# Patient Record
Sex: MALE | Race: WHITE | NOT HISPANIC OR LATINO | Employment: FULL TIME | URBAN - NONMETROPOLITAN AREA
[De-identification: names, ages, dates, MRNs, and addresses within clinical notes are randomized per-mention and may not be internally consistent; named-entity substitution may affect disease eponyms.]

---

## 2023-01-16 PROBLEM — J45.20 MILD INTERMITTENT ASTHMA (HHS-HCC): Status: ACTIVE | Noted: 2023-01-16

## 2023-01-16 PROBLEM — L25.9 CONTACT DERMATITIS OF HAND: Status: ACTIVE | Noted: 2023-01-16

## 2023-01-16 PROBLEM — R06.83 LOUD SNORING: Status: ACTIVE | Noted: 2023-01-16

## 2023-01-16 PROBLEM — F32.0 DEPRESSION, MAJOR, SINGLE EPISODE, MILD (CMS-HCC): Status: ACTIVE | Noted: 2023-01-16

## 2023-01-16 PROBLEM — K21.9 CHRONIC GERD: Status: ACTIVE | Noted: 2023-01-16

## 2023-01-16 PROBLEM — R69 TAKING MEDICATION FOR CHRONIC DISEASE: Status: ACTIVE | Noted: 2023-01-16

## 2023-01-16 PROBLEM — R09.82 PND (POST-NASAL DRIP): Status: ACTIVE | Noted: 2023-01-16

## 2023-01-16 PROBLEM — E66.01 OBESITY, MORBID (MULTI): Status: ACTIVE | Noted: 2023-01-16

## 2023-01-16 PROBLEM — F41.9 ANXIETY: Status: ACTIVE | Noted: 2023-01-16

## 2023-01-16 RX ORDER — ALBUTEROL SULFATE 90 UG/1
1-2 AEROSOL, METERED RESPIRATORY (INHALATION) EVERY 6 HOURS PRN
COMMUNITY
End: 2024-03-12 | Stop reason: SDUPTHER

## 2023-01-16 RX ORDER — FLUTICASONE PROPIONATE AND SALMETEROL XINAFOATE 45; 21 UG/1; UG/1
2 AEROSOL, METERED RESPIRATORY (INHALATION)
COMMUNITY
End: 2024-03-12 | Stop reason: SDUPTHER

## 2023-01-16 RX ORDER — FLUTICASONE PROPIONATE 50 MCG
1-2 SPRAY, SUSPENSION (ML) NASAL DAILY PRN
COMMUNITY

## 2023-01-16 RX ORDER — ESCITALOPRAM OXALATE 20 MG/1
20 TABLET ORAL DAILY
COMMUNITY
End: 2023-06-30 | Stop reason: SDUPTHER

## 2023-01-16 RX ORDER — MONTELUKAST SODIUM 10 MG/1
10 TABLET ORAL DAILY
COMMUNITY
End: 2023-06-30 | Stop reason: SDUPTHER

## 2023-03-06 ENCOUNTER — OFFICE VISIT (OUTPATIENT)
Dept: PRIMARY CARE | Facility: CLINIC | Age: 43
End: 2023-03-06
Payer: COMMERCIAL

## 2023-03-06 VITALS
HEIGHT: 68 IN | BODY MASS INDEX: 34.1 KG/M2 | SYSTOLIC BLOOD PRESSURE: 124 MMHG | OXYGEN SATURATION: 99 % | DIASTOLIC BLOOD PRESSURE: 60 MMHG | WEIGHT: 225 LBS | HEART RATE: 98 BPM

## 2023-03-06 DIAGNOSIS — F32.0 DEPRESSION, MAJOR, SINGLE EPISODE, MILD (CMS-HCC): ICD-10-CM

## 2023-03-06 DIAGNOSIS — J45.20 MILD INTERMITTENT ASTHMA WITHOUT COMPLICATION (HHS-HCC): ICD-10-CM

## 2023-03-06 DIAGNOSIS — E78.2 MIXED HYPERLIPIDEMIA: ICD-10-CM

## 2023-03-06 DIAGNOSIS — R69 TAKING MEDICATION FOR CHRONIC DISEASE: ICD-10-CM

## 2023-03-06 DIAGNOSIS — Z71.84 TRAVEL ADVICE ENCOUNTER: ICD-10-CM

## 2023-03-06 DIAGNOSIS — K21.9 CHRONIC GERD: ICD-10-CM

## 2023-03-06 PROBLEM — E66.01 OBESITY, MORBID (MULTI): Status: RESOLVED | Noted: 2023-01-16 | Resolved: 2023-03-06

## 2023-03-06 LAB
ANION GAP IN SER/PLAS: 10 MMOL/L (ref 10–20)
CALCIUM (MG/DL) IN SER/PLAS: 9.3 MG/DL (ref 8.6–10.3)
CARBON DIOXIDE, TOTAL (MMOL/L) IN SER/PLAS: 29 MMOL/L (ref 21–32)
CHLORIDE (MMOL/L) IN SER/PLAS: 105 MMOL/L (ref 98–107)
CREATININE (MG/DL) IN SER/PLAS: 0.86 MG/DL (ref 0.5–1.3)
GFR MALE: >90 ML/MIN/1.73M2
GLUCOSE (MG/DL) IN SER/PLAS: 91 MG/DL (ref 74–99)
POTASSIUM (MMOL/L) IN SER/PLAS: 3.9 MMOL/L (ref 3.5–5.3)
SODIUM (MMOL/L) IN SER/PLAS: 140 MMOL/L (ref 136–145)
UREA NITROGEN (MG/DL) IN SER/PLAS: 15 MG/DL (ref 6–23)

## 2023-03-06 PROCEDURE — 3008F BODY MASS INDEX DOCD: CPT | Performed by: INTERNAL MEDICINE

## 2023-03-06 PROCEDURE — 1036F TOBACCO NON-USER: CPT | Performed by: INTERNAL MEDICINE

## 2023-03-06 PROCEDURE — 99213 OFFICE O/P EST LOW 20 MIN: CPT | Performed by: INTERNAL MEDICINE

## 2023-03-06 RX ORDER — ATOVAQUONE AND PROGUANIL HYDROCHLORIDE 250; 100 MG/1; MG/1
TABLET, FILM COATED ORAL
Qty: 15 TABLET | Refills: 0 | Status: SHIPPED | OUTPATIENT
Start: 2023-03-06 | End: 2024-03-12 | Stop reason: WASHOUT

## 2023-03-06 ASSESSMENT — ENCOUNTER SYMPTOMS
CHEST TIGHTNESS: 0
ABDOMINAL PAIN: 0
COUGH: 1
BACK PAIN: 0
WHEEZING: 1
ARTHRALGIAS: 0
BLOOD IN STOOL: 0
PALPITATIONS: 0
SHORTNESS OF BREATH: 0
UNEXPECTED WEIGHT CHANGE: 0

## 2023-03-06 ASSESSMENT — PAIN SCALES - GENERAL: PAINLEVEL: 0-NO PAIN

## 2023-03-06 NOTE — PATIENT INSTRUCTIONS
He had his lab work drawn this morning and we have agreed to contact him with results.  If everything goes according to plan we will see him back in approximately 6 months and just prior to that visit we will want a fasting BMP with history of chronic use of medication and a fasting lipid profile with history of hyperlipidemia

## 2023-03-06 NOTE — PROGRESS NOTES
"Subjective   Patient ID: Matt Donahue is a 42 y.o. male who presents for Annual Exam.    HPI he is here today for his routine checkup.  He was able to stop at the lab this morning so unfortunately we do not have results back as of yet.  We have agreed to call him with results.  We did conduct a review of systems and we also discussed his recent health issues.  He is going to be going to [facility] on Saturday for a missions trip.  He is up-to-date with preventative vaccines.    Review of Systems   Constitutional:  Negative for unexpected weight change.   Respiratory:  Positive for cough and wheezing. Negative for chest tightness and shortness of breath.    Cardiovascular:  Negative for chest pain, palpitations and leg swelling.   Gastrointestinal:  Negative for abdominal pain and blood in stool.        Complains of occasional heartburn symptoms   Musculoskeletal:  Negative for arthralgias and back pain.       Objective   /60   Pulse 98   Ht 1.727 m (5' 8\")   Wt 102 kg (225 lb)   SpO2 99%   BMI 34.21 kg/m²     Physical Exam  Vitals and nursing note reviewed.   Constitutional:       General: He is not in acute distress.     Appearance: Normal appearance.   HENT:      Head: Normocephalic and atraumatic.   Eyes:      Conjunctiva/sclera: Conjunctivae normal.   Cardiovascular:      Rate and Rhythm: Normal rate and regular rhythm.      Heart sounds: Normal heart sounds.   Pulmonary:      Effort: No respiratory distress.      Breath sounds: No wheezing.   Abdominal:      Palpations: Abdomen is soft.      Tenderness: There is no abdominal tenderness. There is no guarding.   Musculoskeletal:         General: No swelling. Normal range of motion.   Skin:     General: Skin is warm and dry.   Neurological:      General: No focal deficit present.      Mental Status: He is alert and oriented to person, place, and time.   Psychiatric:         Behavior: Behavior normal.         Assessment/Plan   Problem List Items Addressed " This Visit          Respiratory    Mild intermittent asthma     He states that this time of year he is having some symptoms with his asthma and he has his rescue inhaler as needed.  He states his symptoms are mild at this time.  Unfortunately his current maintenance inhaler is not covered well on his plan and cost approximately $50 a month.  I have asked that he look into other possible more affordable options with his insurance plan and just simply call.  He will call if his symptoms worsen and he did receive this season's flu vaccine from his workplace back in September            Digestive    Chronic GERD     He is taking over-the-counter Pepcid Complete on an as-needed basis and he reports that his acid reflux is under control most of the time.  We do remind him to eat a healthy diet along with routine exercise and weight loss is often times weight loss can reduce symptoms of acid reflux            Endocrine/Metabolic    BMI 34.0-34.9,adult - Primary       Other    Depression, major, single episode, mild (CMS/[facility])     Reports feeling well on the escitalopram as prescribed

## 2023-03-06 NOTE — ASSESSMENT & PLAN NOTE
He is taking over-the-counter Pepcid Complete on an as-needed basis and he reports that his acid reflux is under control most of the time.  We do remind him to eat a healthy diet along with routine exercise and weight loss is often times weight loss can reduce symptoms of acid reflux

## 2023-03-06 NOTE — ASSESSMENT & PLAN NOTE
He states that this time of year he is having some symptoms with his asthma and he has his rescue inhaler as needed.  He states his symptoms are mild at this time.  Unfortunately his current maintenance inhaler is not covered well on his plan and cost approximately $50 a month.  I have asked that he look into other possible more affordable options with his insurance plan and just simply call.  He will call if his symptoms worsen and he did receive this season's flu vaccine from his workplace back in September

## 2023-03-08 ENCOUNTER — TELEPHONE (OUTPATIENT)
Dept: PRIMARY CARE | Facility: CLINIC | Age: 43
End: 2023-03-08
Payer: COMMERCIAL

## 2023-06-30 DIAGNOSIS — R09.82 PND (POST-NASAL DRIP): Primary | ICD-10-CM

## 2023-06-30 DIAGNOSIS — F32.0 DEPRESSION, MAJOR, SINGLE EPISODE, MILD (CMS-HCC): ICD-10-CM

## 2023-07-02 RX ORDER — ESCITALOPRAM OXALATE 20 MG/1
20 TABLET ORAL DAILY
Qty: 90 TABLET | Refills: 1 | Status: SHIPPED | OUTPATIENT
Start: 2023-07-02 | End: 2023-09-11 | Stop reason: SDUPTHER

## 2023-07-02 RX ORDER — MONTELUKAST SODIUM 10 MG/1
10 TABLET ORAL DAILY
Qty: 90 TABLET | Refills: 1 | Status: SHIPPED | OUTPATIENT
Start: 2023-07-02 | End: 2023-09-11 | Stop reason: SDUPTHER

## 2023-09-11 ENCOUNTER — OFFICE VISIT (OUTPATIENT)
Dept: PRIMARY CARE | Facility: CLINIC | Age: 43
End: 2023-09-11
Payer: COMMERCIAL

## 2023-09-11 ENCOUNTER — LAB (OUTPATIENT)
Dept: LAB | Facility: LAB | Age: 43
End: 2023-09-11
Payer: COMMERCIAL

## 2023-09-11 VITALS
HEART RATE: 70 BPM | HEIGHT: 68 IN | BODY MASS INDEX: 33.65 KG/M2 | DIASTOLIC BLOOD PRESSURE: 78 MMHG | SYSTOLIC BLOOD PRESSURE: 126 MMHG | OXYGEN SATURATION: 98 % | WEIGHT: 222 LBS

## 2023-09-11 DIAGNOSIS — E78.2 MIXED HYPERLIPIDEMIA: ICD-10-CM

## 2023-09-11 DIAGNOSIS — F32.0 DEPRESSION, MAJOR, SINGLE EPISODE, MILD (CMS-HCC): ICD-10-CM

## 2023-09-11 DIAGNOSIS — R69 TAKING MEDICATION FOR CHRONIC DISEASE: ICD-10-CM

## 2023-09-11 DIAGNOSIS — J45.20 MILD INTERMITTENT ASTHMA WITHOUT COMPLICATION (HHS-HCC): Primary | ICD-10-CM

## 2023-09-11 DIAGNOSIS — R09.82 PND (POST-NASAL DRIP): ICD-10-CM

## 2023-09-11 PROBLEM — L25.9 CONTACT DERMATITIS OF HAND: Status: RESOLVED | Noted: 2023-01-16 | Resolved: 2023-09-11

## 2023-09-11 LAB
ANION GAP IN SER/PLAS: 11 MMOL/L (ref 10–20)
CALCIUM (MG/DL) IN SER/PLAS: 9 MG/DL (ref 8.6–10.3)
CARBON DIOXIDE, TOTAL (MMOL/L) IN SER/PLAS: 27 MMOL/L (ref 21–32)
CHLORIDE (MMOL/L) IN SER/PLAS: 106 MMOL/L (ref 98–107)
CHOLESTEROL (MG/DL) IN SER/PLAS: 205 MG/DL (ref 0–199)
CHOLESTEROL IN HDL (MG/DL) IN SER/PLAS: 44 MG/DL
CHOLESTEROL/HDL RATIO: 4.7
CREATININE (MG/DL) IN SER/PLAS: 0.79 MG/DL (ref 0.5–1.3)
GFR MALE: >90 ML/MIN/1.73M2
GLUCOSE (MG/DL) IN SER/PLAS: 92 MG/DL (ref 74–99)
LDL: 136 MG/DL (ref 0–99)
POTASSIUM (MMOL/L) IN SER/PLAS: 4.1 MMOL/L (ref 3.5–5.3)
SODIUM (MMOL/L) IN SER/PLAS: 140 MMOL/L (ref 136–145)
TRIGLYCERIDE (MG/DL) IN SER/PLAS: 127 MG/DL (ref 0–149)
UREA NITROGEN (MG/DL) IN SER/PLAS: 17 MG/DL (ref 6–23)
VLDL: 25 MG/DL (ref 0–40)

## 2023-09-11 PROCEDURE — 3008F BODY MASS INDEX DOCD: CPT | Performed by: INTERNAL MEDICINE

## 2023-09-11 PROCEDURE — 36415 COLL VENOUS BLD VENIPUNCTURE: CPT

## 2023-09-11 PROCEDURE — 80061 LIPID PANEL: CPT

## 2023-09-11 PROCEDURE — 1036F TOBACCO NON-USER: CPT | Performed by: INTERNAL MEDICINE

## 2023-09-11 PROCEDURE — 80048 BASIC METABOLIC PNL TOTAL CA: CPT

## 2023-09-11 PROCEDURE — 99214 OFFICE O/P EST MOD 30 MIN: CPT | Performed by: INTERNAL MEDICINE

## 2023-09-11 RX ORDER — ESCITALOPRAM OXALATE 20 MG/1
20 TABLET ORAL DAILY
Qty: 90 TABLET | Refills: 1 | Status: SHIPPED | OUTPATIENT
Start: 2023-09-11 | End: 2024-03-12 | Stop reason: SDUPTHER

## 2023-09-11 RX ORDER — MONTELUKAST SODIUM 10 MG/1
10 TABLET ORAL DAILY
Qty: 90 TABLET | Refills: 1 | Status: SHIPPED | OUTPATIENT
Start: 2023-09-11 | End: 2024-03-11

## 2023-09-11 ASSESSMENT — ENCOUNTER SYMPTOMS
ABDOMINAL PAIN: 0
WHEEZING: 0
SHORTNESS OF BREATH: 0
BLOOD IN STOOL: 0
ARTHRALGIAS: 0
PALPITATIONS: 0
DIARRHEA: 0
COUGH: 0
FATIGUE: 0
NAUSEA: 0
VOMITING: 0
BACK PAIN: 0

## 2023-09-11 NOTE — PROGRESS NOTES
Subjective   Patient ID: Matt Donahue is a 43 y.o. male who presents for No chief complaint on file..  HPI  He is here today for his routine checkup.  He went to have his lab work drawn this morning so they are not back yet but I have agreed to contact him with the results once they are known.  Overall he states that he is doing okay.  We briefly discussed his history of allergies and asthma and his condition has been stable.  He has been unable to get his maintenance inhaler due to cost issues and lack of good health coverage.  He states he does plan on getting his flu vaccine this season through the workplace.  He is doing well in the way of his anxiety and depression and we are providing a refill on his escitalopram.  He states he occasionally gets heartburn but nothing that is severe or frequent.  If everything goes according to plan we will see him back in 6 months for reevaluation and sooner if any problems.  Review of Systems   Constitutional:  Negative for fatigue.   Respiratory:  Negative for cough, shortness of breath and wheezing.    Cardiovascular:  Negative for chest pain, palpitations and leg swelling.   Gastrointestinal:  Negative for abdominal pain, blood in stool, diarrhea, nausea and vomiting.   Musculoskeletal:  Negative for arthralgias and back pain.     Objective   Physical Exam  Vitals and nursing note reviewed.   Constitutional:       General: He is not in acute distress.     Appearance: Normal appearance.   HENT:      Head: Normocephalic and atraumatic.   Eyes:      Conjunctiva/sclera: Conjunctivae normal.   Cardiovascular:      Rate and Rhythm: Normal rate and regular rhythm.      Heart sounds: Normal heart sounds.   Pulmonary:      Effort: No respiratory distress.      Breath sounds: No wheezing.   Abdominal:      Palpations: Abdomen is soft.      Tenderness: There is no abdominal tenderness. There is no guarding.   Musculoskeletal:         General: No swelling. Normal range of motion.    Skin:     General: Skin is warm and dry.   Neurological:      General: No focal deficit present.      Mental Status: He is alert and oriented to person, place, and time.   Psychiatric:         Behavior: Behavior normal.         Assessment/Plan   Problem List Items Addressed This Visit       Depression, major, single episode, mild (CMS/HCC)     -Doing well         Relevant Medications    escitalopram (Lexapro) 20 mg tablet    Mild intermittent asthma - Primary     -Stable at this time  -He will contact us if we can help him with a new prescription for maintenance inhaler  -He will be getting the season's flu vaccine very soon         PND (post-nasal drip)     -Doing relatively well with montelukast 10 mg daily  -Is still using Flonase nasal spray         Relevant Medications    montelukast (Singulair) 10 mg tablet          Sandy Dias,

## 2023-09-11 NOTE — ASSESSMENT & PLAN NOTE
-Stable at this time  -He will contact us if we can help him with a new prescription for maintenance inhaler  -He will be getting the season's flu vaccine very soon

## 2023-09-11 NOTE — PATIENT INSTRUCTIONS
I will be contacting you via [employer] regarding your laboratory test results and please do not hesitate to reach out to me if you have any questions  Please remember to get the season's flu vaccine  Please let me know if you are in need of a prescription for a maintenance inhaler and possibly we could research what the least expensive inhaler might be on your plan  Otherwise if everything goes according to plan I would like to see you back in approximately 6 months

## 2023-11-01 ENCOUNTER — APPOINTMENT (OUTPATIENT)
Dept: RADIOLOGY | Facility: HOSPITAL | Age: 43
End: 2023-11-01
Payer: COMMERCIAL

## 2023-11-01 ENCOUNTER — HOSPITAL ENCOUNTER (EMERGENCY)
Facility: HOSPITAL | Age: 43
Discharge: HOME | End: 2023-11-01
Attending: EMERGENCY MEDICINE
Payer: COMMERCIAL

## 2023-11-01 VITALS
WEIGHT: 220 LBS | OXYGEN SATURATION: 98 % | HEIGHT: 68 IN | DIASTOLIC BLOOD PRESSURE: 96 MMHG | HEART RATE: 93 BPM | BODY MASS INDEX: 33.34 KG/M2 | TEMPERATURE: 97.9 F | SYSTOLIC BLOOD PRESSURE: 130 MMHG | RESPIRATION RATE: 16 BRPM

## 2023-11-01 DIAGNOSIS — M75.22 BICIPITAL TENDINITIS OF LEFT SHOULDER: Primary | ICD-10-CM

## 2023-11-01 PROCEDURE — 73030 X-RAY EXAM OF SHOULDER: CPT | Mod: LEFT SIDE | Performed by: STUDENT IN AN ORGANIZED HEALTH CARE EDUCATION/TRAINING PROGRAM

## 2023-11-01 PROCEDURE — 2500000001 HC RX 250 WO HCPCS SELF ADMINISTERED DRUGS (ALT 637 FOR MEDICARE OP): Performed by: EMERGENCY MEDICINE

## 2023-11-01 PROCEDURE — 99283 EMERGENCY DEPT VISIT LOW MDM: CPT | Mod: 25 | Performed by: EMERGENCY MEDICINE

## 2023-11-01 PROCEDURE — 73030 X-RAY EXAM OF SHOULDER: CPT | Mod: LT,FY

## 2023-11-01 RX ORDER — IBUPROFEN 600 MG/1
600 TABLET ORAL ONCE
Status: COMPLETED | OUTPATIENT
Start: 2023-11-01 | End: 2023-11-01

## 2023-11-01 RX ADMIN — IBUPROFEN 600 MG: 600 TABLET ORAL at 19:47

## 2023-11-01 ASSESSMENT — COLUMBIA-SUICIDE SEVERITY RATING SCALE - C-SSRS
1. IN THE PAST MONTH, HAVE YOU WISHED YOU WERE DEAD OR WISHED YOU COULD GO TO SLEEP AND NOT WAKE UP?: NO
6. HAVE YOU EVER DONE ANYTHING, STARTED TO DO ANYTHING, OR PREPARED TO DO ANYTHING TO END YOUR LIFE?: NO
2. HAVE YOU ACTUALLY HAD ANY THOUGHTS OF KILLING YOURSELF?: NO

## 2023-11-01 ASSESSMENT — PAIN SCALES - GENERAL
PAINLEVEL_OUTOF10: 3
PAINLEVEL_OUTOF10: 2
PAINLEVEL_OUTOF10: 3

## 2023-11-01 ASSESSMENT — PAIN - FUNCTIONAL ASSESSMENT
PAIN_FUNCTIONAL_ASSESSMENT: 0-10
PAIN_FUNCTIONAL_ASSESSMENT: 0-10

## 2023-11-01 ASSESSMENT — PAIN DESCRIPTION - PAIN TYPE: TYPE: ACUTE PAIN

## 2023-11-01 ASSESSMENT — PAIN DESCRIPTION - ORIENTATION: ORIENTATION: LEFT

## 2023-11-01 ASSESSMENT — PAIN DESCRIPTION - LOCATION: LOCATION: SHOULDER

## 2023-11-01 NOTE — ED PROVIDER NOTES
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  Presents with a chief complaint of left shoulder pain occurred approximately 1.5 hours ago.  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  At that moment he heard a pop in his left shoulder and felt immediate pain.  No numbness or tingling.  Now he has pain at rest which is worse with lifting.         Review of Systems     Physical Exam  Vitals and nursing note reviewed.   Constitutional:       General: He is not in acute distress.     Appearance: He is well-developed.   HENT:      Head: Normocephalic and atraumatic.   Eyes:      Conjunctiva/sclera: Conjunctivae normal.   Cardiovascular:      Rate and Rhythm: Normal rate and regular rhythm.      Heart sounds: No murmur heard.  Pulmonary:      Effort: Pulmonary effort is normal. No respiratory distress.      Breath sounds: Normal breath sounds.   Abdominal:      Palpations: Abdomen is soft.      Tenderness: There is no abdominal tenderness.   Musculoskeletal:         General: No swelling.      Cervical back: Neck supple.      Comments: Focused exam of the left upper extremity reveals no deformity.  He does have tenderness in the area of the bicipital tendon.  He has increased pain with flexion of the left shoulder as well as resisted flexion at the left elbow.  No erythema.  No bulge in the area of the biceps.  Sensation intact distally   Skin:     General: Skin is warm and dry.      Capillary Refill: Capillary refill takes less than 2 seconds.   Neurological:      Mental Status: He is alert.   Psychiatric:         Mood and Affect: Mood normal.          Labs Reviewed - No data to display     XR shoulder left 2+ views   Final Result   No evidence of acute fracture or dislocation.             Signed by: Rk Roper 11/1/2023 7:56 PM   Dictation workstation:   [id number]           Procedures     Medical Decision Making  Patient's  x-rays are negative for fracture or dislocation of the left shoulder.  Clinically exam is consistent with a bicipital tendon injury.  Will recommend sling for the next 2 to 3 days during activity, rest, cold packs, anti-inflammatory medications.         Diagnoses as of 11/01/23 2015   Bicipital tendinitis of left shoulder                    Sylvester Rawls MD  11/01/23 2015

## 2023-11-02 NOTE — DISCHARGE INSTRUCTIONS
Wear sling for the next 2 or 3 days during activity.  May take it off if sitting resting or sleeping.  It is for comfort only.  Recommend rest, cold packs, anti-inflammatory medications.

## 2023-11-06 ENCOUNTER — APPOINTMENT (OUTPATIENT)
Dept: PRIMARY CARE | Facility: CLINIC | Age: 43
End: 2023-11-06
Payer: COMMERCIAL

## 2023-12-18 ENCOUNTER — EVALUATION (OUTPATIENT)
Dept: PHYSICAL THERAPY | Facility: CLINIC | Age: 43
End: 2023-12-18
Payer: COMMERCIAL

## 2023-12-18 DIAGNOSIS — M75.22 BICIPITAL TENDINITIS, LEFT SHOULDER: ICD-10-CM

## 2023-12-18 PROCEDURE — 97161 PT EVAL LOW COMPLEX 20 MIN: CPT | Mod: GP

## 2023-12-18 ASSESSMENT — ENCOUNTER SYMPTOMS
DEPRESSION: 0
OCCASIONAL FEELINGS OF UNSTEADINESS: 0
LOSS OF SENSATION IN FEET: 0

## 2023-12-18 ASSESSMENT — PAIN SCALES - GENERAL: PAINLEVEL_OUTOF10: 4

## 2023-12-18 ASSESSMENT — PAIN - FUNCTIONAL ASSESSMENT: PAIN_FUNCTIONAL_ASSESSMENT: 0-10

## 2023-12-18 ASSESSMENT — ACTIVITIES OF DAILY LIVING (ADL): EFFECT OF PAIN ON DAILY ACTIVITIES: SEE GOALS

## 2023-12-18 NOTE — Clinical Note
December 18, 2023    ALESSANDRO Poole  [address]  [address]  [facility] [address]    Patient: Matt Donahue   YOB: 1980   Date of Visit: 12/18/2023       Dear Alessandro Poole  [address]  [address]  [place] [address]    The attached plan of care is being sent to you because your patient’s medical reimbursement requires that you certify the plan of care. Your signature is required to allow uninterrupted insurance coverage.      You may indicate your approval by signing below and faxing this form back to us at Dept Fax: [phone].    Please call Dept: [phone] with any questions or concerns.    Thank you for this referral,        Billy Lozano, PT  [address] [address]    Payer: Payor: IND[employer] EMPLOYEE / Plan: INDUSTRIAL EMPLOYEE / Product Type: *No Product type* /                                                                         Date:     Dear Billy Lozano, PT,     Re: Mr. Matt Donahue, MRN:[id number]    I certify that I have reviewed the attached plan of care and it is medically necessary for Mr. Matt Donahue (1980) who is under my care.          ______________________________________                    _________________  Provider name and credentials                                           Date and time                                                                                           Plan of Care 12/18/23   Effective from: 12/18/2023  Effective to: 3/17/2024    Plan ID: [id number]            Participants as of Finalize on 12/18/2023    Name Type Comments Contact Info    ALESSANDRO Poole Referring Provider  [phone]    Billy Lozano PT Physical Therapist  [phone]       Last Plan Note     Author: Billy Lozano PT Status: Signed Last edited: 12/18/2023 11:30 AM       Physical Therapy Evaluation and Treatment      Patient Name: Matt Donahue  MRN:  [id number]  Today's Date: 12/18/2023  Time Calculation  Start Time: 1145  Stop Time: 1215  Time Calculation (min): 30 min      Assessment:  Rehab Prognosis: Excellent  C/C sx began with a patient transfer on 11/1.   Films were taken 11/1.  Physical findings include mild shldr pain with endrange active elevation.   There is also pain at the anterior shldr with abd MMT and PROM endranges.   Continue with open to closed chain ROM/PRE as trent.  Plan:  Treatment/Interventions: Cryotherapy, Dry needling, Education/ Instruction, Electrical stimulation, Hot pack, Manual therapy, Self care/ home management, Therapeutic exercises (IASTM/cupping)  PT Plan: Skilled PT  PT Frequency: 3 times per week  Duration: 4wks  Onset Date: 11/01/23  Rehab Potential: Excellent  Plan of Care Agreement: Patient    Current Problem:   1. Bicipital tendinitis, left shoulder  Referral to Physical Therapy          Subjective    General:  General  Reason for Referral: L shldr biceps tendinitis  Referred By: Rodrigo  Precautions:  Precautions  STEADI Fall Risk Score (The score of 4 or more indicates an increased risk of falling): 0  Precautions Comment: none    Pain:  Pain Assessment  Pain Assessment: 0-10  Pain Score: 4 (max sx)  Pain Location: Shoulder  Pain Orientation: Left  Effect of Pain on Daily Activities: see goals    Prior Level of Function:  Prior Function Per Pt/Caregiver Report  Vocational: Full time employment (nurse)  Hand Dominance: Right    Objective     Outcome Measures:  Other Measures  Other Outcome Measures: 9% QDI     Treatments: eval only-time   Continue with open to closed chain ROM/PRE as trent.  OP EDUCATION:  Outpatient Education  Individual(s) Educated: Patient  Patient/Caregiver Demonstrated Understanding: yes  Plan of Care Discussed and Agreed Upon: yes    Goals:  Active       PT Problem       PT Goal 1       Start:  12/18/23    Expected End:  03/17/24       1. Independent HEP to allow for 50% reduction in max ADL C/C sx (  "4/10) 2-3wks  2. 0/10 night time sx to allow for uninterrupted sleep x 1wk ( 7/7) 2-3wks  3. Survey score improvement from 9% to 0% (QDI) 3-4WKS  4. ROM increase to allow for ADL driving (full active elevation with reduced pain) 3-4wks  5. Strength increase to allow for improved ADL patient transfers (from 4/4+ abd/ER to 5/5 L shldr) 3-4wks         PT Goal 2       Start:  12/18/23    Expected End:  03/17/24       1. .\"I hope to have no pain after a work shift\"             Shoulder        Shoulder AROM WFL unless documented below  R Shoulder flexion: (180°): 150  L Shoulder flexion: (180°): 150 (mild shldr pain)  Shoulder PROM WFL unless documented below  R shoulder flexion: (180°): 155  L shoulder flexion: (180°): 155  R shoulder ER: (90°): 90  L shoulder ER: (90°): 85 (pop at the shldr with mild pain)  R shoulder IR: (70°): 80  L shoulder IR: (70°): 90    Shoulder Strength WFL unless documented below  R shoulder abduction: (5/5): 5/5  L shoulder abduction: (5/5): 4/5 (anterior shldr pain)  R shoulder ER: (5/5): 5/5  L shoulder ER: (5/5): 4+/5  R shoulder IR: (5/5) : 5/5  L shoulder IR: (5/5): 4+/5           Current Participants as of 12/18/2023    Name Type Comments Contact Info    Kathy Wallace, ARY-CNP Referring Provider  [phone]    Signature pending    Billy Lozano, PT Physical Therapist  [phone]          "

## 2023-12-18 NOTE — PROGRESS NOTES
Physical Therapy Evaluation and Treatment      Patient Name: Matt Donahue  MRN: [id number]  Today's Date: 12/18/2023  Time Calculation  Start Time: 1145  Stop Time: 1215  Time Calculation (min): 30 min      Assessment:  Rehab Prognosis: Excellent  C/C sx began with a patient transfer on 11/1.   Films were taken 11/1.  Physical findings include mild shldr pain with endrange active elevation.   There is also pain at the anterior shldr with abd MMT and PROM endranges.   Continue with open to closed chain ROM/PRE as trent.  Plan:  Treatment/Interventions: Cryotherapy, Dry needling, Education/ Instruction, Electrical stimulation, Hot pack, Manual therapy, Self care/ home management, Therapeutic exercises (IASTM/cupping)  PT Plan: Skilled PT  PT Frequency: 3 times per week  Duration: 4wks  Onset Date: 11/01/23  Rehab Potential: Excellent  Plan of Care Agreement: Patient    Current Problem:   1. Bicipital tendinitis, left shoulder  Referral to Physical Therapy          Subjective    General:  General  Reason for Referral: L shldr biceps tendinitis  Referred By: Rodrigo  Precautions:  Precautions  STEADI Fall Risk Score (The score of 4 or more indicates an increased risk of falling): 0  Precautions Comment: none    Pain:  Pain Assessment  Pain Assessment: 0-10  Pain Score: 4 (max sx)  Pain Location: Shoulder  Pain Orientation: Left  Effect of Pain on Daily Activities: see goals    Prior Level of Function:  Prior Function Per Pt/Caregiver Report  Vocational: Full time employment (nurse)  Hand Dominance: Right    Objective     Outcome Measures:  Other Measures  Other Outcome Measures: 9% QDI     Treatments: eval only-time   Continue with open to closed chain ROM/PRE as trent.  OP EDUCATION:  Outpatient Education  Individual(s) Educated: Patient  Patient/Caregiver Demonstrated Understanding: yes  Plan of Care Discussed and Agreed Upon: yes    Goals:  Active       PT Problem       PT Goal 1       Start:  12/18/23    Expected End:   "03/17/24       1. Independent HEP to allow for 50% reduction in max ADL C/C sx ( 4/10) 2-3wks  2. 0/10 night time sx to allow for uninterrupted sleep x 1wk ( 7/7) 2-3wks  3. Survey score improvement from 9% to 0% (QDI) 3-4WKS  4. ROM increase to allow for ADL driving (full active elevation with reduced pain) 3-4wks  5. Strength increase to allow for improved ADL patient transfers (from 4/4+ abd/ER to 5/5 L shldr) 3-4wks         PT Goal 2       Start:  12/18/23    Expected End:  03/17/24       1. .\"I hope to have no pain after a work shift\"             Shoulder        Shoulder AROM WFL unless documented below  R Shoulder flexion: (180°): 150  L Shoulder flexion: (180°): 150 (mild shldr pain)  Shoulder PROM WFL unless documented below  R shoulder flexion: (180°): 155  L shoulder flexion: (180°): 155  R shoulder ER: (90°): 90  L shoulder ER: (90°): 85 (pop at the shldr with mild pain)  R shoulder IR: (70°): 80  L shoulder IR: (70°): 90    Shoulder Strength WFL unless documented below  R shoulder abduction: (5/5): 5/5  L shoulder abduction: (5/5): 4/5 (anterior shldr pain)  R shoulder ER: (5/5): 5/5  L shoulder ER: (5/5): 4+/5  R shoulder IR: (5/5) : 5/5  L shoulder IR: (5/5): 4+/5  5/5 B elb flex/ext without pain  "

## 2023-12-19 ENCOUNTER — TREATMENT (OUTPATIENT)
Dept: PHYSICAL THERAPY | Facility: CLINIC | Age: 43
End: 2023-12-19
Payer: COMMERCIAL

## 2023-12-19 DIAGNOSIS — M75.22 BICIPITAL TENDINITIS, LEFT SHOULDER: ICD-10-CM

## 2023-12-19 PROCEDURE — 97110 THERAPEUTIC EXERCISES: CPT | Mod: GP,CQ

## 2023-12-19 PROCEDURE — 97140 MANUAL THERAPY 1/> REGIONS: CPT | Mod: GP,CQ

## 2023-12-19 ASSESSMENT — PAIN - FUNCTIONAL ASSESSMENT: PAIN_FUNCTIONAL_ASSESSMENT: 0-10

## 2023-12-19 ASSESSMENT — PAIN SCALES - GENERAL: PAINLEVEL_OUTOF10: 4

## 2023-12-19 NOTE — PROGRESS NOTES
"Physical Therapy  Treatment      Patient Name: Matt Donahue  MRN: [id number]  Today's Date: 12/19/2023  Time Calculation  Start Time: 1354  Stop Time: 1443  Time Calculation (min): 49 min      Assessment: Patient identified by name and birth date. IASTM/STM completed to reduce pain and soft tissue restriction in L shoulder musculature. Instructed in shoulder T band exercises for HEP. Bands , sheet, and Thera -Loop given. He voiced good understanding.        Plan:Continue with manual therapy to reduce soft tissue restriction / pain and strengthening to improved stability of L shoulder for  increased ease/endurance with work ADLs.-CG.         Treatment/Interventions: Cryotherapy, Dry needling, Education/ Instruction, Electrical stimulation, Hot pack, Manual therapy, Self care/ home management, Therapeutic exercises (IASTM/cupping)  PT Plan: Skilled PT  PT Frequency: 3 times per week  Duration: 4wks  Onset Date: 11/01/23  Rehab Potential: Excellent  Plan of Care Agreement: Patient  Current Problem:   1. Bicipital tendinitis, left shoulder  Follow Up In Physical Therapy            Subjective  He reports after his work shift pain  4/10 in L shoulder at anterior /superior aspect. Notes with initial work injury his shoulder was dislocated and he \"popped it in\" .        Precautions:  Precautions  Precautions Comment: none    Pain:  Pain Assessment  Pain Assessment: 0-10  Pain Score: 4  Pain Type: Acute pain  Pain Location: Shoulder  Pain Orientation: Left          Treatments:   Standing UBE 3mins fwd 3mins bwd   ER Grn Band walk Outs 10x (N)  IR Blue Band Walk Outs 10x (N)  Rows Pink Cords 10x (N)  SA Punches 10x (N)            Manual:        IASTM/STM to L: pecs delts UT biceps                    HG9 brush J hook frame Bevel Up 0-30* (N)  PROM all planes   GH mobs posterior grade 1-2      OP EDUCATION:     Access Code: [email or web address]  URL: [email or web address]  Date: 12/19/2023  Prepared by: Luda " "Villagomez    Exercises  - Shoulder External Rotation with Anchored Resistance  - 1 x daily - 7 x weekly - 2 sets - 10 reps  - Shoulder Internal Rotation with Resistance  - 1 x daily - 7 x weekly - 2 sets - 10 reps  - Standing Shoulder Row with Anchored Resistance  - 1 x daily - 7 x weekly - 2 sets - 10 reps  Goals:  Active       PT Problem       PT Goal 1       Start:  12/18/23    Expected End:  03/17/24       1. Independent HEP to allow for 50% reduction in max ADL C/C sx ( 4/10) 2-3wks  2. 0/10 night time sx to allow for uninterrupted sleep x 1wk ( 7/7) 2-3wks  3. Survey score improvement from 9% to 0% (QDI) 3-4WKS  4. ROM increase to allow for ADL driving (full active elevation with reduced pain) 3-4wks  5. Strength increase to allow for improved ADL patient transfers (from 4/4+ abd/ER to 5/5 L shldr) 3-4wks         PT Goal 2       Start:  12/18/23    Expected End:  03/17/24       1. .\"I hope to have no pain after a work shift\"             "

## 2023-12-20 ENCOUNTER — APPOINTMENT (OUTPATIENT)
Dept: PHYSICAL THERAPY | Facility: CLINIC | Age: 43
End: 2023-12-20
Payer: COMMERCIAL

## 2023-12-27 ENCOUNTER — HOSPITAL ENCOUNTER (EMERGENCY)
Facility: HOSPITAL | Age: 43
Discharge: HOME | End: 2023-12-27
Attending: EMERGENCY MEDICINE
Payer: COMMERCIAL

## 2023-12-27 ENCOUNTER — APPOINTMENT (OUTPATIENT)
Dept: RADIOLOGY | Facility: HOSPITAL | Age: 43
End: 2023-12-27
Payer: COMMERCIAL

## 2023-12-27 ENCOUNTER — APPOINTMENT (OUTPATIENT)
Dept: CARDIOLOGY | Facility: HOSPITAL | Age: 43
End: 2023-12-27
Payer: COMMERCIAL

## 2023-12-27 VITALS
WEIGHT: 220 LBS | OXYGEN SATURATION: 99 % | DIASTOLIC BLOOD PRESSURE: 71 MMHG | RESPIRATION RATE: 18 BRPM | BODY MASS INDEX: 33.34 KG/M2 | TEMPERATURE: 98.2 F | SYSTOLIC BLOOD PRESSURE: 128 MMHG | HEIGHT: 68 IN | HEART RATE: 88 BPM

## 2023-12-27 DIAGNOSIS — R07.9 CHEST PAIN, UNSPECIFIED TYPE: Primary | ICD-10-CM

## 2023-12-27 LAB
ALBUMIN SERPL BCP-MCNC: 4.5 G/DL (ref 3.4–5)
ALP SERPL-CCNC: 102 U/L (ref 33–120)
ALT SERPL W P-5'-P-CCNC: 29 U/L (ref 10–52)
ANION GAP SERPL CALC-SCNC: 14 MMOL/L (ref 10–20)
AST SERPL W P-5'-P-CCNC: 22 U/L (ref 9–39)
BASOPHILS # BLD AUTO: 0.05 X10*3/UL (ref 0–0.1)
BASOPHILS NFR BLD AUTO: 0.7 %
BILIRUB SERPL-MCNC: 0.7 MG/DL (ref 0–1.2)
BUN SERPL-MCNC: 17 MG/DL (ref 6–23)
CALCIUM SERPL-MCNC: 9.5 MG/DL (ref 8.6–10.3)
CARDIAC TROPONIN I PNL SERPL HS: <3 NG/L (ref 0–20)
CARDIAC TROPONIN I PNL SERPL HS: <3 NG/L (ref 0–20)
CHLORIDE SERPL-SCNC: 106 MMOL/L (ref 98–107)
CO2 SERPL-SCNC: 22 MMOL/L (ref 21–32)
CREAT SERPL-MCNC: 0.85 MG/DL (ref 0.5–1.3)
EOSINOPHIL # BLD AUTO: 0.13 X10*3/UL (ref 0–0.7)
EOSINOPHIL NFR BLD AUTO: 1.8 %
ERYTHROCYTE [DISTWIDTH] IN BLOOD BY AUTOMATED COUNT: 11.6 % (ref 11.5–14.5)
GFR SERPL CREATININE-BSD FRML MDRD: >90 ML/MIN/1.73M*2
GLUCOSE SERPL-MCNC: 105 MG/DL (ref 74–99)
HCT VFR BLD AUTO: 42.1 % (ref 41–52)
HGB BLD-MCNC: 15 G/DL (ref 13.5–17.5)
IMM GRANULOCYTES # BLD AUTO: 0.03 X10*3/UL (ref 0–0.7)
IMM GRANULOCYTES NFR BLD AUTO: 0.4 % (ref 0–0.9)
LYMPHOCYTES # BLD AUTO: 2 X10*3/UL (ref 1.2–4.8)
LYMPHOCYTES NFR BLD AUTO: 27.4 %
MAGNESIUM SERPL-MCNC: 2.06 MG/DL (ref 1.6–2.4)
MCH RBC QN AUTO: 31 PG (ref 26–34)
MCHC RBC AUTO-ENTMCNC: 35.6 G/DL (ref 32–36)
MCV RBC AUTO: 87 FL (ref 80–100)
MONOCYTES # BLD AUTO: 0.87 X10*3/UL (ref 0.1–1)
MONOCYTES NFR BLD AUTO: 11.9 %
NEUTROPHILS # BLD AUTO: 4.21 X10*3/UL (ref 1.2–7.7)
NEUTROPHILS NFR BLD AUTO: 57.8 %
NRBC BLD-RTO: 0 /100 WBCS (ref 0–0)
PLATELET # BLD AUTO: 174 X10*3/UL (ref 150–450)
POTASSIUM SERPL-SCNC: 3.8 MMOL/L (ref 3.5–5.3)
PROT SERPL-MCNC: 7 G/DL (ref 6.4–8.2)
RBC # BLD AUTO: 4.84 X10*6/UL (ref 4.5–5.9)
SODIUM SERPL-SCNC: 138 MMOL/L (ref 136–145)
WBC # BLD AUTO: 7.3 X10*3/UL (ref 4.4–11.3)

## 2023-12-27 PROCEDURE — 36415 COLL VENOUS BLD VENIPUNCTURE: CPT | Performed by: EMERGENCY MEDICINE

## 2023-12-27 PROCEDURE — 84484 ASSAY OF TROPONIN QUANT: CPT | Performed by: EMERGENCY MEDICINE

## 2023-12-27 PROCEDURE — 93005 ELECTROCARDIOGRAM TRACING: CPT

## 2023-12-27 PROCEDURE — 80053 COMPREHEN METABOLIC PANEL: CPT | Performed by: EMERGENCY MEDICINE

## 2023-12-27 PROCEDURE — 71045 X-RAY EXAM CHEST 1 VIEW: CPT

## 2023-12-27 PROCEDURE — 85025 COMPLETE CBC W/AUTO DIFF WBC: CPT | Performed by: EMERGENCY MEDICINE

## 2023-12-27 PROCEDURE — 99284 EMERGENCY DEPT VISIT MOD MDM: CPT | Performed by: EMERGENCY MEDICINE

## 2023-12-27 PROCEDURE — 83735 ASSAY OF MAGNESIUM: CPT | Performed by: EMERGENCY MEDICINE

## 2023-12-27 PROCEDURE — 71045 X-RAY EXAM CHEST 1 VIEW: CPT | Performed by: RADIOLOGY

## 2023-12-27 RX ORDER — ACETAMINOPHEN 500 MG
5000 TABLET ORAL DAILY
COMMUNITY

## 2023-12-27 ASSESSMENT — PAIN DESCRIPTION - DESCRIPTORS
DESCRIPTORS: HEAVINESS
DESCRIPTORS: HEAVINESS;PRESSURE

## 2023-12-27 ASSESSMENT — PAIN SCALES - GENERAL: PAINLEVEL_OUTOF10: 3

## 2023-12-27 ASSESSMENT — HEART SCORE
AGE: 45-64
HEART SCORE: 3
RISK FACTORS: 1-2 RISK FACTORS
ECG: NORMAL
HISTORY: MODERATELY SUSPICIOUS
TROPONIN: LESS THAN OR EQUAL TO NORMAL LIMIT

## 2023-12-27 ASSESSMENT — COLUMBIA-SUICIDE SEVERITY RATING SCALE - C-SSRS
1. IN THE PAST MONTH, HAVE YOU WISHED YOU WERE DEAD OR WISHED YOU COULD GO TO SLEEP AND NOT WAKE UP?: NO
2. HAVE YOU ACTUALLY HAD ANY THOUGHTS OF KILLING YOURSELF?: NO
6. HAVE YOU EVER DONE ANYTHING, STARTED TO DO ANYTHING, OR PREPARED TO DO ANYTHING TO END YOUR LIFE?: NO

## 2023-12-27 ASSESSMENT — PAIN - FUNCTIONAL ASSESSMENT: PAIN_FUNCTIONAL_ASSESSMENT: 0-10

## 2023-12-27 ASSESSMENT — PAIN DESCRIPTION - LOCATION: LOCATION: CHEST

## 2023-12-27 NOTE — ED PROVIDER NOTES
HPI   No chief complaint on file.      Limitations to History: None    HPI: 43-year-old male presents with concern for chest tightness.  Began 10 minutes prior to arrival.  States he also has a tightness in his throat and into his face as well as left shoulder.  Denies any shortness of breath, nausea, vomiting, diaphoresis.  Denies any fever, chills, cough, fall or trauma.    ------------------------------------------------------------------------------------------------------------------------------------------  Physical Exam:    VS: As documented in the triage note and EMR flowsheet from this visit were reviewed.    Appearance: Alert. cooperative,  in no acute distress.   Skin: Intact,  dry skin, no lesions, rash, petechiae or purpura.   Eyes: PERRLA, EOMs intact,  Conjunctiva pink with no redness or exudates.   HENT: Normocephalic, atraumatic. Nares patent. No intraoral lesions.   Neck: Supple, without meningismus. Trachea at midline. No lymphadenopathy.  Pulmonary: Clear bilaterally with good chest wall excursion. No rales, rhonchi or wheezing. No accessory muscle use or stridor.  Cardiac: Regular rate and rhythm, no rubs, murmurs, or gallops.   Abdomen: Abdomen is soft, nontender, and nondistended.  No palpable organomegaly.  No rebound or guarding.  No CVA tenderness. Nonsurgical abdomen  Genitourinary: Exam deferred.  Musculoskeletal: Full range of motion.  Pulses full and equal. No cyanosis, clubbing, or edema.  Neurological:  Cranial nerves are grossly intact, grossly normal sensation, no weakness, no focal findings identified.  Psychiatric: Appropriate mood and affect.                            No data recorded                Patient History   Past Medical History:   Diagnosis Date    Anxiety     Arthritis     Asthma     Depression      Past Surgical History:   Procedure Laterality Date    APPENDECTOMY  1996    OTHER SURGICAL HISTORY  10/31/2019    Appendectomy     Family History   Problem Relation Name  Age of Onset    Arthritis Mother Sharon     Hypertension Mother Sharon     Asthma Mother Sharon     Hyperlipidemia Mother Sharon     Arthritis Father Chirag     Hypertension Father Chirag     Hyperlipidemia Father Chirag     Heart attack Father Chirag      Social History     Tobacco Use    Smoking status: Never     Passive exposure: Never    Smokeless tobacco: Never   Substance Use Topics    Alcohol use: Yes     Alcohol/week: 2.0 standard drinks of alcohol     Types: 2 Cans of beer per week    Drug use: Not Currently       Physical Exam   ED Triage Vitals   Temp Pulse Resp BP   -- -- -- --      SpO2 Temp src Heart Rate Source Patient Position   -- -- -- --      BP Location FiO2 (%)     -- --       Physical Exam    ED Course & MDM   Diagnoses as of 12/27/23 1057   Chest pain, unspecified type       Medical Decision Making  Labs Reviewed  COMPREHENSIVE METABOLIC PANEL - Abnormal     Glucose                       105 (*)                Sodium                        138                    Potassium                     3.8                    Chloride                      106                    Bicarbonate                   22                     Anion Gap                     14                     Urea Nitrogen                 17                     Creatinine                    0.85                   eGFR                          >90                    Calcium                       9.5                    Albumin                       4.5                    Alkaline Phosphatase          102                    Total Protein                 7.0                    AST                           22                     Bilirubin, Total              0.7                    ALT                           29                  MAGNESIUM - Normal     Magnesium                     2.06                SERIAL TROPONIN-INITIAL - Normal     Troponin I, High Sensitivity   <3                         Narrative: Less than 99th percentile of normal range cutoff-                   Female and children under 18 years old <14 ng/L; Male <21 ng/L: Negative                  Repeat testing should be performed if clinically indicated.                                     Female and children under 18 years old 14-50 ng/L; Male 21-50 ng/L:                  Consistent with possible cardiac damage and possible increased clinical                   risk. Serial measurements may help to assess extent of myocardial damage.                                     >50 ng/L: Consistent with cardiac damage, increased clinical risk and                  myocardial infarction. Serial measurements may help assess extent of                   myocardial damage.                                      NOTE: Children less than 1 year old may have higher baseline troponin                   levels and results should be interpreted in conjunction with the overall                   clinical context.                                     NOTE: Troponin I testing is performed using a different                   testing methodology at [facility] than at other                   [facility]. Direct result comparisons should only                   be made within the same method.  SERIAL TROPONIN, 1 HOUR - Normal     Troponin I, High Sensitivity   <3                         Narrative: Less than 99th percentile of normal range cutoff-                  Female and children under 18 years old <14 ng/L; Male <21 ng/L: Negative                  Repeat testing should be performed if clinically indicated.                                     Female and children under 18 years old 14-50 ng/L; Male 21-50 ng/L:                  Consistent with possible cardiac damage and possible increased clinical                   risk. Serial measurements may help to assess extent of myocardial damage.                                     >50 ng/L: Consistent with cardiac damage, increased clinical risk and                  myocardial  infarction. Serial measurements may help assess extent of                   myocardial damage.                                      NOTE: Children less than 1 year old may have higher baseline troponin                   levels and results should be interpreted in conjunction with the overall                   clinical context.                                     NOTE: Troponin I testing is performed using a different                   testing methodology at [facility] than at other                   [facility]. Direct result comparisons should only                   be made within the same method.  TROPONIN SERIES- (INITIAL, 1 HR)         Narrative: The following orders were created for panel order Troponin I Series, High Sensitivity (0, 1 HR).                  Procedure                               Abnormality         Status                                     ---------                               -----------         ------                                     Troponin I, High Sensiti...[[id number]]  Normal              Final result                               Troponin, High Sensitivi...[[id number]]  Normal              Final result                                                 Please view results for these tests on the individual orders.  CBC WITH AUTO DIFFERENTIAL  XR chest 1 view   Final Result    No acute cardiopulmonary process.          MACRO:    None          Signed by: Funmilayo Winter 12/27/2023 8:45 AM    Dictation workstation:   [id number]     Medical Decision Making:    Patient appears well nontoxic.  Vital signs within normal limits.  EKG initially and on repeat shows no acute ischemia.  Troponin negative x 2.  Chest x-ray clear.  Heart score of 3.  Patient was given cardiology follow-up and asked return for new or worsening symptoms.  Stable at time of discharge.    Differential Diagnoses Considered: ACS, pneumothorax, pneumonia, musculoskeletal pain    Independent  Interpretation of Studies:  I independently interpreted: Chest x-ray shows no evidence of pneumonia or pneumothorax.    Escalation of Care:  Appropriate for discharge and follow-up with primary care and cardiology.            Procedure  ECG 12 lead    Performed by: Roberto Carlos Dorado DO  Authorized by: Roberto Carlos Dorado DO    ECG interpreted by ED Physician in the absence of a cardiologist: yes    Comments:      EKG interpreted by Dr. Roberto Carlos Dorado: Normal sinus rhythm at 76 bpm.  SC interval 128 ms.  QTc of 441 ms.  ECG 12 lead    Performed by: Roberto Carlos Dorado DO  Authorized by: Roberto Carlos Dorado DO    ECG interpreted by ED Physician in the absence of a cardiologist: yes    Comments:      Repeat EKG performed at 0931 interpreted by Dr. Roberto Carlos Dorado at 0934: Normal sinus rhythm at 75 bpm.  SC interval 124 ms.  QTc of 446 ms.  No evidence of ST elevation or depression at this time.       Roberto Carlos Dorado DO  12/27/23 1059

## 2023-12-29 ENCOUNTER — TREATMENT (OUTPATIENT)
Dept: PHYSICAL THERAPY | Facility: CLINIC | Age: 43
End: 2023-12-29
Payer: COMMERCIAL

## 2023-12-29 DIAGNOSIS — M75.22 BICIPITAL TENDINITIS, LEFT SHOULDER: ICD-10-CM

## 2023-12-29 PROCEDURE — 97140 MANUAL THERAPY 1/> REGIONS: CPT | Mod: GP,CQ | Performed by: PHYSICAL THERAPY ASSISTANT

## 2023-12-29 PROCEDURE — 97110 THERAPEUTIC EXERCISES: CPT | Mod: GP,CQ | Performed by: PHYSICAL THERAPY ASSISTANT

## 2023-12-29 ASSESSMENT — PAIN SCALES - GENERAL: PAINLEVEL_OUTOF10: 2

## 2023-12-29 ASSESSMENT — PAIN - FUNCTIONAL ASSESSMENT: PAIN_FUNCTIONAL_ASSESSMENT: 0-10

## 2023-12-29 NOTE — PROGRESS NOTES
"Physical Therapy Treatment    Patient Name: Matt Donahue  MRN: [id number]  Today's Date: 12/29/2023  Time Calculation  Start Time: 1000  Stop Time: 1045  Time Calculation (min): 45 min      Assessment: Patient tolerated treatment without increased pain.  Patient reports \"I can feel it\" post treatment. Performed PROM to left shoulder with no restrictions noted.  Continue with ROM/strength to improve mobility and ADL.       Plan:Continue with left shoulder ROM/strength to improve sleeping, mobility and ADL.       Current Problem  Problem List Items Addressed This Visit             ICD-10-CM       Musculoskeletal and Injuries    Bicipital tendinitis, left shoulder M75.22       Subjective  Patient reports no falls and states 2/10 left shoulder pain.  Therapist sanitized hands pre/post treatment.    Reason for Referral: L shoulder biceps  Referred By: Rodrigo Leiva  Precautions  Precautions Comment: none  Pain  Pain Assessment: 0-10  Pain Score: 2  Pain Type: Acute pain  Pain Location: Shoulder  Pain Orientation: Left    Treatments:  Standing UBE 3mins fwd 3mins bwd   ER Grn Band walk Outs 10x   IR Blue Band Walk Outs 10x   Rows [place] Cords 10x   Standing shoulder extension x 10 pink cord  SA Punches 10x   CANE THERE EX:  Flexion x 10  Abd x 10  ER x 10  PROM x 10 reps ea. Dir.  Wall washing x 10 ea. Direction,                 Goals:  Active       PT Problem       PT Goal 1       Start:  12/18/23    Expected End:  03/17/24       1. Independent HEP to allow for 50% reduction in max ADL C/C sx ( 4/10) 2-3wks  2. 0/10 night time sx to allow for uninterrupted sleep x 1wk ( 7/7) 2-3wks  3. Survey score improvement from 9% to 0% (QDI) 3-4WKS  4. ROM increase to allow for ADL driving (full active elevation with reduced pain) 3-4wks  5. Strength increase to allow for improved ADL patient transfers (from 4/4+ abd/ER to 5/5 L shldr) 3-4wks         PT Goal 2       Start:  12/18/23    Expected End:  03/17/24       1. .\"I hope " "to have no pain after a work shift\"            "

## 2024-01-08 ENCOUNTER — TREATMENT (OUTPATIENT)
Dept: PHYSICAL THERAPY | Facility: CLINIC | Age: 44
End: 2024-01-08
Payer: COMMERCIAL

## 2024-01-08 DIAGNOSIS — M75.22 BICIPITAL TENDINITIS, LEFT SHOULDER: ICD-10-CM

## 2024-01-08 PROCEDURE — 97140 MANUAL THERAPY 1/> REGIONS: CPT | Mod: GP,CQ

## 2024-01-08 PROCEDURE — 97110 THERAPEUTIC EXERCISES: CPT | Mod: GP,CQ

## 2024-01-08 ASSESSMENT — PAIN - FUNCTIONAL ASSESSMENT: PAIN_FUNCTIONAL_ASSESSMENT: 0-10

## 2024-01-08 ASSESSMENT — PAIN SCALES - GENERAL: PAINLEVEL_OUTOF10: 1

## 2024-01-08 NOTE — PROGRESS NOTES
"Physical Therapy Treatment    Patient Name: Matt Donahue  MRN: [id number]  Today's Date: 1/8/2024  Time Calculation  Start Time: 1000  Stop Time: 1044  Time Calculation (min): 44 min  Visits: 4/12    Assessment:   Patient identified by name and date of birth. Patient was able to progress with strengthening with addition of ball on wall and D1/D2 flexion with fatigue noted and mild soreness at end of treatment. He presented with muscle tension in UT and LS added stretch secondary. Reviewed importance of HEP compliance with patient he verbalized understanding.     Plan:  OP PT Plan  Treatment/Interventions: Cryotherapy, Dry needling, Education/ Instruction, Electrical stimulation, Hot pack, Manual therapy, Self care/ home management, Therapeutic exercises (IASTM/cupping)  PT Plan: Skilled PT  PT Frequency: 3 times per week  Duration: 4wks  Onset Date: 11/01/23  Rehab Potential: Excellent  Plan of Care Agreement: Patient    Continue with progression of strengthening and ROM to increase with ease of reaching with job duties. AW    Current Problem  Problem List Items Addressed This Visit             ICD-10-CM    Bicipital tendinitis, left shoulder M75.22       Subjective  Patient reported compliance with HEP 20% of the time. Patient reported 1/10 pain after treatment.   General  Reason for Referral: L shoulder biceps  Referred By: Rodrigo Leiva  Precautions  Precautions Comment: none    Pain  Pain Assessment: 0-10  Pain Score: 1  Pain Type: Acute pain  Pain Location: Shoulder  Pain Orientation: Left     Treatments:  Therapeutic Exercise  Therapeutic Exercise Performed: Yes  Standing UBE 3mins fwd 3mins bwd   Active ER Grn 2 x 10 (P from walkouts)  Active IR Green 2 x 10 (P from walkouts)   Rows Pink Cords 2 x 10 (P)  Standing shoulder extension 2 x 10 pink cord  Ball on Wall 2 x 10 (N)  -flex/ext,lateral, CW, CCW   D1 & D2 flex 0# x10 (N)  SA Punches 2 x 10 3# (P)  CANE THERE EX:  Flexion x 10 5\" Hold  Abd x 10 " "5\" hold   ER x 10 5\" hold   Upper trap stretch 1 x 30\" (B)  LS stretch 1 x 30\" L     Wall washing x 10 ea. Direction, (X)    Manual Therapy  Manual Therapy Performed: Yes  IASTM/STM to L: pecs delts UT biceps  HG9 brush J hook frame Bevel Up 0-30* (N)  PROM all planes   GH mobs posterior grade 1-2      OP EDUCATION:   Access Code: [email or web address]  URL: [email or web address]  Date: 12/19/2023  Prepared by: Luda Villagomez     Exercises  - Shoulder External Rotation with Anchored Resistance  - 1 x daily - 7 x weekly - 2 sets - 10 reps  - Shoulder Internal Rotation with Resistance  - 1 x daily - 7 x weekly - 2 sets - 10 reps  - Standing Shoulder Row with Anchored Resistance  - 1 x daily - 7 x weekly - 2 sets - 10 reps    Goals:  Active       PT Problem       PT Goal 1       Start:  12/18/23    Expected End:  03/17/24       1. Independent HEP to allow for 50% reduction in max ADL C/C sx ( 4/10) 2-3wks  2. 0/10 night time sx to allow for uninterrupted sleep x 1wk ( 7/7) 2-3wks  3. Survey score improvement from 9% to 0% (QDI) 3-4WKS  4. ROM increase to allow for ADL driving (full active elevation with reduced pain) 3-4wks  5. Strength increase to allow for improved ADL patient transfers (from 4/4+ abd/ER to 5/5 L shldr) 3-4wks         PT Goal 2       Start:  12/18/23    Expected End:  03/17/24       1. .\"I hope to have no pain after a work shift\"            "

## 2024-01-10 LAB
ATRIAL RATE: 75 BPM
ATRIAL RATE: 76 BPM
P AXIS: 24 DEGREES
P AXIS: 28 DEGREES
P OFFSET: 205 MS
P OFFSET: 207 MS
P ONSET: 157 MS
P ONSET: 159 MS
PR INTERVAL: 124 MS
PR INTERVAL: 128 MS
Q ONSET: 221 MS
Q ONSET: 221 MS
QRS COUNT: 12 BEATS
QRS COUNT: 13 BEATS
QRS DURATION: 102 MS
QRS DURATION: 102 MS
QT INTERVAL: 392 MS
QT INTERVAL: 400 MS
QTC CALCULATION(BAZETT): 441 MS
QTC CALCULATION(BAZETT): 446 MS
QTC FREDERICIA: 424 MS
QTC FREDERICIA: 430 MS
R AXIS: 10 DEGREES
R AXIS: 28 DEGREES
T AXIS: 30 DEGREES
T AXIS: 42 DEGREES
T OFFSET: 417 MS
T OFFSET: 421 MS
VENTRICULAR RATE: 75 BPM
VENTRICULAR RATE: 76 BPM

## 2024-01-12 ENCOUNTER — TREATMENT (OUTPATIENT)
Dept: PHYSICAL THERAPY | Facility: CLINIC | Age: 44
End: 2024-01-12
Payer: COMMERCIAL

## 2024-01-12 DIAGNOSIS — M75.22 BICIPITAL TENDINITIS, LEFT SHOULDER: ICD-10-CM

## 2024-01-12 PROCEDURE — 97110 THERAPEUTIC EXERCISES: CPT | Mod: GP,CQ

## 2024-01-12 PROCEDURE — 97140 MANUAL THERAPY 1/> REGIONS: CPT | Mod: GP,CQ

## 2024-01-12 ASSESSMENT — PAIN SCALES - GENERAL: PAINLEVEL_OUTOF10: 0 - NO PAIN

## 2024-01-12 ASSESSMENT — PAIN - FUNCTIONAL ASSESSMENT: PAIN_FUNCTIONAL_ASSESSMENT: 0-10

## 2024-01-12 NOTE — PROGRESS NOTES
"Physical Therapy Treatment    Patient Name: Matt Donahue  MRN: [id number]  Today's Date: 1/12/2024  Time Calculation  Start Time: 1445  Stop Time: 1530  Time Calculation (min): 45 min  Visits:5/12    Assessment:   Patient identified by name and date of birth. Patient demonstrated good tolerance to progression of strengthening with fatigue noted. He demonstrated firm end feel with PROM.    Plan:  OP PT Plan  Treatment/Interventions: Cryotherapy, Dry needling, Education/ Instruction, Electrical stimulation, Hot pack, Manual therapy, Self care/ home management, Therapeutic exercises (IASTM/cupping)  PT Plan: Skilled PT  PT Frequency: 3 times per week  Duration: 4wks  Onset Date: 11/01/23  Rehab Potential: Excellent  Plan of Care Agreement: Patient    Progress with strengthening and postural strengthening for increased ease with overhead adl's.AW    Current Problem  Problem List Items Addressed This Visit             ICD-10-CM    Bicipital tendinitis, left shoulder M75.22       Subjective Patient reported compliance with % of the time and verbalized understanding.  He reported muscle fatigue w/o pain after treatment.   General  Reason for Referral: L shoulder biceps  Referred By: Rodrigo  Precautions  Precautions  Precautions Comment: none    Pain  Pain Assessment: 0-10  Pain Score: 0 - No pain     Treatments:  Therapeutic Exercise  Therapeutic Exercise Performed: Yes  Standing UBE 3mins fwd 3mins bwd level 2   Active ER Grn 2 x 10 (P)  Active IR Green 2 x 10 (P)  Rows magenta  Cords 2 x 10 (P)  Standing shoulder extension 2 x 10 magenta cord (P)  Ball on Wall x20   -flex/ext,lateral, CW, CCW   D1 & D2 flex 1# x10 (P)  Stabilize and reach 2 x 10 orange band B   SA Punches 2 x 10 3#     CANE THERE EX:  Flexion x 10 5\" Hold  Abd x 10 5\" hold   ER x 10 5\" hold   Upper trap stretch 1 x 30\" (B)  LS stretch 1 x 30\" L      Wall washing x 10 ea. Direction, (X)  Manual Therapy  Manual Therapy Performed: Yes   IASTM/STM " "to L: pecs delts UT biceps  HG9 brush J hook frame Bevel Up 0-30* (N)  PROM all planes   GH mobs posterior grade 1-2     OP EDUCATION:   Access Code: [email or web address]  URL: [email or web address]  Date: 12/19/2023  Prepared by: Luda Villagomez     Exercises  - Shoulder External Rotation with Anchored Resistance  - 1 x daily - 7 x weekly - 2 sets - 10 reps  - Shoulder Internal Rotation with Resistance  - 1 x daily - 7 x weekly - 2 sets - 10 reps  - Standing Shoulder Row with Anchored Resistance  - 1 x daily - 7 x weekly - 2 sets - 10 reps    Goals:  Active       PT Problem       PT Goal 1       Start:  12/18/23    Expected End:  03/17/24       1. Independent HEP to allow for 50% reduction in max ADL C/C sx ( 4/10) 2-3wks  2. 0/10 night time sx to allow for uninterrupted sleep x 1wk ( 7/7) 2-3wks  3. Survey score improvement from 9% to 0% (QDI) 3-4WKS  4. ROM increase to allow for ADL driving (full active elevation with reduced pain) 3-4wks  5. Strength increase to allow for improved ADL patient transfers (from 4/4+ abd/ER to 5/5 L shldr) 3-4wks         PT Goal 2       Start:  12/18/23    Expected End:  03/17/24       1. .\"I hope to have no pain after a work shift\"            "

## 2024-01-15 ENCOUNTER — TREATMENT (OUTPATIENT)
Dept: PHYSICAL THERAPY | Facility: CLINIC | Age: 44
End: 2024-01-15
Payer: COMMERCIAL

## 2024-01-15 DIAGNOSIS — M75.22 BICIPITAL TENDINITIS, LEFT SHOULDER: ICD-10-CM

## 2024-01-15 PROCEDURE — 97110 THERAPEUTIC EXERCISES: CPT | Mod: GP,CQ

## 2024-01-15 PROCEDURE — 97140 MANUAL THERAPY 1/> REGIONS: CPT | Mod: GP,CQ

## 2024-01-15 ASSESSMENT — PAIN - FUNCTIONAL ASSESSMENT: PAIN_FUNCTIONAL_ASSESSMENT: 0-10

## 2024-01-15 ASSESSMENT — PAIN SCALES - GENERAL: PAINLEVEL_OUTOF10: 1

## 2024-01-15 NOTE — PROGRESS NOTES
"Physical Therapy Treatment    Patient Name: Matt Donahue  MRN: [id number]  Today's Date: 1/17/2024  Time Calculation  Start Time: 1130  Stop Time: 1214  Time Calculation (min): 44 min    Assessment:   Patient identified by name and date of birth. Patient demonstrated good understanding of exercises and quick fatigue with stabilize and reach and addition of tapping sequence. He presented with increased muscle tension with STW this date. Instructed with use of tennis ball for HEP, he demonstrated good understanding.     Plan:  OP PT Plan  Treatment/Interventions: Cryotherapy, Dry needling, Education/ Instruction, Electrical stimulation, Hot pack, Manual therapy, Self care/ home management, Therapeutic exercises (IASTM/cupping)  PT Plan: Skilled PT  PT Frequency: 3 times per week  Duration: 4wks  Onset Date: 11/01/23  Rehab Potential: Excellent  Plan of Care Agreement: Patient  Continue with shoulder UE strengthening for increased ease with work duties. AW  Current Problem  Problem List Items Addressed This Visit             ICD-10-CM    Bicipital tendinitis, left shoulder M75.22       Subjective [place]   General  Reason for Referral: L shoulder biceps  Referred By: Rodrigo  Precautions  Precautions  Precautions Comment: none    Pain  Pain Assessment: 0-10  Pain Score: 1  Pain Type: Chronic pain  Pain Location: Shoulder  Pain Orientation: Left     Treatments:     Standing UBE 3mins fwd 3mins bwd level 2   Active ER Blue 2 x 10 (P)  Active IR Blue 2 x 10 (P)  Rows magenta Cords 2 x 10 (P)  Standing shoulder extension 2 x 10 magenta cord (P)  Ball on Wall x20 2#  -flex/ext,lateral, CW, CCW   Stabilize and reach 2 x 10 orange band B   D1 & D2 flex 1# x10   SA Punches 2 x 10 3# (X)  Tapping 2 targets 3 x 30\"      CANE THERE EX:  Flexion x 10 5\" Hold  Abd x 10 5\" hold   ER x 10 5\" hold   Upper trap stretch 1 x 30\" (B)  LS stretch 1 x 30\" L      Wall washing x 10 ea. Direction, (X)     Manual Therapy  Manual Therapy " "Performed: Yes   IASTM/STM to L: pecs delts UT biceps scap border (STM only)  HG9 brush J hook frame Bevel Up 0-30* (X)  PROM all planes   GH mobs posterior grade 1-2     OP EDUCATION:   Access Code: [email or web address]  URL: [email or web address]  Date: 12/19/2023  Prepared by: Luda Villagomez     Exercises  - Shoulder External Rotation with Anchored Resistance  - 1 x daily - 7 x weekly - 2 sets - 10 reps  - Shoulder Internal Rotation with Resistance  - 1 x daily - 7 x weekly - 2 sets - 10 reps  - Standing Shoulder Row with Anchored Resistance  - 1 x daily - 7 x weekly - 2 sets - 10 reps       Goals:  Active       PT Problem       PT Goal 1       Start:  12/18/23    Expected End:  03/17/24       1. Independent HEP to allow for 50% reduction in max ADL C/C sx ( 4/10) 2-3wks  2. 0/10 night time sx to allow for uninterrupted sleep x 1wk ( 7/7) 2-3wks  3. Survey score improvement from 9% to 0% (QDI) 3-4WKS  4. ROM increase to allow for ADL driving (full active elevation with reduced pain) 3-4wks  5. Strength increase to allow for improved ADL patient transfers (from 4/4+ abd/ER to 5/5 L shldr) 3-4wks         PT Goal 2       Start:  12/18/23    Expected End:  03/17/24       1. .\"I hope to have no pain after a work shift\"            "

## 2024-01-17 ENCOUNTER — TREATMENT (OUTPATIENT)
Dept: PHYSICAL THERAPY | Facility: CLINIC | Age: 44
End: 2024-01-17
Payer: COMMERCIAL

## 2024-01-17 DIAGNOSIS — M75.22 BICIPITAL TENDINITIS, LEFT SHOULDER: ICD-10-CM

## 2024-01-17 PROCEDURE — 97140 MANUAL THERAPY 1/> REGIONS: CPT | Mod: GP,CQ

## 2024-01-17 PROCEDURE — 97110 THERAPEUTIC EXERCISES: CPT | Mod: GP,CQ

## 2024-01-17 ASSESSMENT — PAIN SCALES - GENERAL: PAINLEVEL_OUTOF10: 2

## 2024-01-17 ASSESSMENT — PAIN - FUNCTIONAL ASSESSMENT: PAIN_FUNCTIONAL_ASSESSMENT: 0-10

## 2024-01-17 NOTE — PROGRESS NOTES
Physical Therapy Treatment    Patient Name: Matt Donahue  MRN: [id number]  Today's Date: 1/17/2024  Time Calculation  Start Time: 1231  Stop Time: 1315  Time Calculation (min): 44 min    Assessment:   Patient identified by name and date of birth. Patient presented with mild decrease in ROM in flexion with increased tension noted. Added pulleys for HEP he demonstrated good understanding. He was able to progress with strengthening with BOSU rocks and small range BOSU push ups with fatigue noted.     Plan:  OP PT Plan  Treatment/Interventions: Cryotherapy, Dry needling, Education/ Instruction, Electrical stimulation, Hot pack, Manual therapy, Self care/ home management, Therapeutic exercises (IASTM/cupping)  PT Plan: Skilled PT  PT Frequency: 3 times per week  Duration: 4wks  Onset Date: 11/01/23  Rehab Potential: Excellent  Plan of Care Agreement: Patient  Continue with progression of strengthening with addition of prone exercises next visit for increased endurence with overhead activity.   Current Problem  Problem List Items Addressed This Visit             ICD-10-CM    Bicipital tendinitis, left shoulder M75.22       Subjective Patient reported that he used ice after his last treatment. He reported he used pain meds prior to treatment due to 3/10 pain before. Patient reported 2/10 pain after treatment.   General  Reason for Referral: L shoulder biceps  Referred By: Rodrigo Leiva  Precautions  Precautions Comment: none    Pain  Pain Assessment: 0-10  Pain Score: 2  Pain Type: Acute pain, Chronic pain  Pain Location: Shoulder  Pain Orientation: Left     Treatments:  Therapeutic Exercise  Therapeutic Exercise Performed: Yes  Therapeutic Exercise  Therapeutic Exercise Performed: Yes  Standing UBE 3mins fwd 3mins bwd level 2   Pulley 3' total   Active ER Blue 2 x 10   Active IR Blue 2 x 10   Active ER/IR in 90 degrees 2x10 orange/green ea  Rows magenta Cords 2 x 10 (P)  Standing shoulder extension 2 x 10 magenta  "cord   Ball on Wall x20 4# (P)  -flex/ext,lateral, CW, CCW   Stabilize and reach 2 x 10 orange band B   D1 & D2 flex 1# x10 (X)  Tapping 2 targets 3 x 30\"   BOSU Rocks on elevated plinth lat,flex,CW,CCW 2 x 10 (N)  BOSU push up on elevated plinth x 10 (N)  Prone   -flex,ext,row,abd (A)     CANE THERE EX:  SA Punches 2 x 10 3# (X)  Flexion x 10 5\" Hold  Abd x 10 5\" hold   ER x 10 5\" hold   Upper trap stretch 1 x 30\" (B)  LS stretch 1 x 30\" L      Wall washing x 10 ea. Direction, (X)    Manual Therapy  Manual Therapy Performed: Yes    IASTM/STM to L: pecs delts UT biceps scap border (STM only)  HG9 brush J hook frame Bevel Up 0-30* (X)  PROM all planes   GH mobs posterior grade 1-2     OP EDUCATION:   Access Code: [email or web address]  URL: [email or web address]  Date: 12/19/2023  Prepared by: Luda Villagomez     Exercises  - Shoulder External Rotation with Anchored Resistance  - 1 x daily - 7 x weekly - 2 sets - 10 reps  - Shoulder Internal Rotation with Resistance  - 1 x daily - 7 x weekly - 2 sets - 10 reps  - Standing Shoulder Row with Anchored Resistance  - 1 x daily - 7 x weekly - 2 sets - 10 reps    Goals:  Active       PT Problem       PT Goal 1       Start:  12/18/23    Expected End:  03/17/24       1. Independent HEP to allow for 50% reduction in max ADL C/C sx ( 4/10) 2-3wks  2. 0/10 night time sx to allow for uninterrupted sleep x 1wk ( 7/7) 2-3wks  3. Survey score improvement from 9% to 0% (QDI) 3-4WKS  4. ROM increase to allow for ADL driving (full active elevation with reduced pain) 3-4wks  5. Strength increase to allow for improved ADL patient transfers (from 4/4+ abd/ER to 5/5 L shldr) 3-4wks         PT Goal 2       Start:  12/18/23    Expected End:  03/17/24       1. .\"I hope to have no pain after a work shift\"            "

## 2024-01-22 ENCOUNTER — TREATMENT (OUTPATIENT)
Dept: PHYSICAL THERAPY | Facility: CLINIC | Age: 44
End: 2024-01-22
Payer: COMMERCIAL

## 2024-01-22 DIAGNOSIS — M75.22 BICIPITAL TENDINITIS, LEFT SHOULDER: ICD-10-CM

## 2024-01-22 PROCEDURE — 97140 MANUAL THERAPY 1/> REGIONS: CPT | Mod: GP,CQ

## 2024-01-22 PROCEDURE — 97110 THERAPEUTIC EXERCISES: CPT | Mod: GP,CQ

## 2024-01-22 ASSESSMENT — PAIN - FUNCTIONAL ASSESSMENT: PAIN_FUNCTIONAL_ASSESSMENT: 0-10

## 2024-01-22 ASSESSMENT — PAIN SCALES - GENERAL: PAINLEVEL_OUTOF10: 4

## 2024-01-22 NOTE — PROGRESS NOTES
Physical Therapy Treatment    Patient Name: Matt Donahue  MRN: [id number]  Today's Date: 1/22/2024  Time Calculation  Start Time: 1400  Stop Time: 1444  Time Calculation (min): 44 min    Assessment:   Patient identified by name and date of birth. Patient demonstrated quick fatigue with exercises this date. He presented with muscle tension with STW and firm end feel with flexion this date.     Plan:  OP PT Plan  Treatment/Interventions: Cryotherapy, Dry needling, Education/ Instruction, Electrical stimulation, Hot pack, Manual therapy, Self care/ home management, Therapeutic exercises (IASTM/cupping)  PT Plan: Skilled PT  PT Frequency: 3 times per week  Duration: 4wks  Onset Date: 11/01/23  Rehab Potential: Excellent  Plan of Care Agreement: Patient  Continue with shoulder strengthening with add prone exercises next treatment for increased endurance with UE overhead use. AW  Current Problem  Problem List Items Addressed This Visit             ICD-10-CM    Bicipital tendinitis, left shoulder M75.22       Subjective Patient reported compliance with HEP  100% of the time and verbalized understanding, up until the day prior to treatment. He reported he was required to assisted with changing a hot water tank the day prior and has experienced increased soreness this AM. He reported 2/10 pain after treatment.   General  Reason for Referral: L shoulder biceps  Referred By: Rodrigo Leiva  Precautions  Precautions Comment: none  Pain  Pain Assessment: 0-10  Pain Score: 4  Pain Type: Acute pain, Chronic pain  Pain Location: Shoulder  Pain Orientation: Left     Treatments:  Therapeutic Exercise  Therapeutic Exercise Performed: Yes  Standing UBE 3mins fwd 3mins bwd level 2   Pulley 3' total   Active ER Blue 2 x 15 (P)  Active IR Blue 2 x 15 (P)  Active ER/IR in 90 degrees 2x10 orange/green ea  Rows magenta Cords 2 x 10 (X)  Standing shoulder extension 2 x 10 magenta cord (X)  Ball on Wall x20 4# (P)  -flex/ext,lateral,  "CW, CCW   Stabilize and reach 2 x 10 orange band B   D1 & D2 flex 2# x10 (P)  Tapping 2 targets 3 x 45\"   BOSU Rocks on elevated plinth lat,flex,CW,CCW 2 x 10 BOSU push up on elevated plinth x 15 (P)  Prone   -flex,ext,row,abd (A)     CANE THERE EX:  SA Punches 2 x 10 3# (X)  Flexion x 10 5\" Hold  Abd x 10 5\" hold   ER x 10 5\" hold   Upper trap stretch 1 x 30\" (B)  LS stretch 1 x 30\" L      Wall washing x 10 ea. Direction, (X)     Manual Therapy  Manual Therapy Performed: Yes   IASTM/STM to L: pecs delts UT biceps scap border (STM only)  HG9 brush J hook frame Bevel Up 0-30* (X)  PROM all planes   GH mobs posterior grade 1-2   OP EDUCATION:   Access Code: [email or web address]  URL: [email or web address]  Date: 12/19/2023  Prepared by: Luda Villagomez     Exercises  - Shoulder External Rotation with Anchored Resistance  - 1 x daily - 7 x weekly - 2 sets - 10 reps  - Shoulder Internal Rotation with Resistance  - 1 x daily - 7 x weekly - 2 sets - 10 reps  - Standing Shoulder Row with Anchored Resistance  - 1 x daily - 7 x weekly - 2 sets - 10 reps    Goals:  Active       PT Problem       PT Goal 1       Start:  12/18/23    Expected End:  03/17/24       1. Independent HEP to allow for 50% reduction in max ADL C/C sx ( 4/10) 2-3wks  2. 0/10 night time sx to allow for uninterrupted sleep x 1wk ( 7/7) 2-3wks  3. Survey score improvement from 9% to 0% (QDI) 3-4WKS  4. ROM increase to allow for ADL driving (full active elevation with reduced pain) 3-4wks  5. Strength increase to allow for improved ADL patient transfers (from 4/4+ abd/ER to 5/5 L shldr) 3-4wks         PT Goal 2       Start:  12/18/23    Expected End:  03/17/24       1. .\"I hope to have no pain after a work shift\"            "

## 2024-01-26 ENCOUNTER — APPOINTMENT (OUTPATIENT)
Dept: PHYSICAL THERAPY | Facility: CLINIC | Age: 44
End: 2024-01-26
Payer: COMMERCIAL

## 2024-01-26 ENCOUNTER — DOCUMENTATION (OUTPATIENT)
Dept: PHYSICAL THERAPY | Facility: CLINIC | Age: 44
End: 2024-01-26
Payer: COMMERCIAL

## 2024-01-26 NOTE — PROGRESS NOTES
Physical Therapy                 Therapy Communication Note    Patient Name: Matt Donahue  MRN: [id number]  Today's Date: 1/26/2024     Discipline: Physical Therapy    Missed Visit Reason:      Missed Time: Cancel    Comment:Canceled via MyChart (I will call the office and reschedule. )

## 2024-01-30 ENCOUNTER — PHARMACY VISIT (OUTPATIENT)
Dept: PHARMACY | Facility: CLINIC | Age: 44
End: 2024-01-30
Payer: COMMERCIAL

## 2024-01-30 PROCEDURE — RXMED WILLOW AMBULATORY MEDICATION CHARGE

## 2024-01-30 RX ORDER — MELOXICAM 7.5 MG/1
7.5 TABLET ORAL DAILY
Qty: 10 TABLET | Refills: 0 | OUTPATIENT
Start: 2024-01-30 | End: 2024-02-09

## 2024-02-09 ENCOUNTER — TREATMENT (OUTPATIENT)
Dept: PHYSICAL THERAPY | Facility: CLINIC | Age: 44
End: 2024-02-09
Payer: COMMERCIAL

## 2024-02-09 DIAGNOSIS — M75.22 BICIPITAL TENDINITIS, LEFT SHOULDER: ICD-10-CM

## 2024-02-09 PROCEDURE — 97110 THERAPEUTIC EXERCISES: CPT | Mod: GP,CQ

## 2024-02-09 ASSESSMENT — PAIN SCALES - GENERAL: PAINLEVEL_OUTOF10: 0 - NO PAIN

## 2024-02-09 ASSESSMENT — PAIN - FUNCTIONAL ASSESSMENT: PAIN_FUNCTIONAL_ASSESSMENT: 0-10

## 2024-02-09 NOTE — PROGRESS NOTES
Physical Therapy Treatment    Patient Name: Matt Donahue  MRN: [id number]  Today's Date: 2/9/2024  Time Calculation  Start Time: 1315  Stop Time: 1400  Time Calculation (min): 45 min      Assessment:   Patient identified by name and date of birth. Patient demonstrated muscle fatigue with ball on wall and with stabilize and reach. Added prone strengthening this date he demonstrated good tolerance with fatigue noted after.       Plan:  OP PT Plan  Treatment/Interventions: Cryotherapy, Dry needling, Education/ Instruction, Electrical stimulation, Hot pack, Manual therapy, Self care/ home management, Therapeutic exercises (IASTM/cupping)  PT Plan: Skilled PT  PT Frequency: 3 times per week  Duration: 4wks  Onset Date: 11/01/23  Rehab Potential: Excellent  Plan of Care Agreement: Patient  Continue with progression of strengthening for increased ease with sleeping without awaking with Sx.   Current Problem  Problem List Items Addressed This Visit             ICD-10-CM    Bicipital tendinitis, left shoulder M75.22       Subjective Patient reported compliance with % of the time and verbalized understanding.  Patient reported he overall has not been experiencing Sx other than with sleeping and continues to awake at times due to discomfort. Patient reported 0/10 pain after treatment.    General  Reason for Referral: L shoulder biceps  Referred By: Rodrigo    Precautions  Precautions  Precautions Comment: none    Pain  Pain Assessment: 0-10  Pain Score: 0 - No pain     Treatments:  Therapeutic Exercise  Therapeutic Exercise Performed: Yes  Standing UBE 3mins fwd 3mins bwd level 2   Pulley 3' total   Active ER Blue 2 x 15   Active IR Blue 2 x 15   Active ER/IR in 90 degrees 2x10 orange/green ea  Rows magenta Cords 2 x 10 (X)  Standing shoulder extension 2 x 10 magenta cord  Ball on Wall x20 4#   -flex/ext,lateral, CW, CCW   Stabilize and reach 2 x 10 yellow loop band B   D1 & D2 flex 2# x10 (X)  Tapping 2 targets 3 x  "45\" (X)  BOSU [place] on elevated plinth lat,flex,CW,CCW 2 x 10   BOSU push up on elevated plinth x 15 (X)  Prone   -flex,ext,abd 2 x 10 1# (N)  -row 2 x 10 3# (N)   Not this date;   CANE THERE EX:  SA Punches 2 x 10 3# (X)  Flexion x 10 5\" Hold  Abd x 10 5\" hold   ER x 10 5\" hold   Upper trap stretch 1 x 30\" (B)  LS stretch 1 x 30\" L      Wall washing x 10 ea. Direction, (X)  Manual Therapy  Manual Therapy Performed: Yes      IASTM/STM to L: pecs delts UT biceps scap border (STM only)  HG9 brush J hook frame Bevel Up 0-30* (X)  PROM all planes   GH mobs posterior grade 1-2     OP EDUCATION:   Access Code: [email or web address]  URL: [email or web address]  Date: 02/09/2024  Prepared by: Myesha Schwarz    Exercises  - Prone Shoulder Horizontal Abduction  - 1 x daily - 7 x weekly - 2 sets - 10 reps  - Prone Shoulder Flexion with Dumbbell  - 1 x daily - 7 x weekly - 2 sets - 10 reps  - Prone Shoulder Extension - Single Arm  - 1 x daily - 7 x weekly - 2 sets - 10 reps  - Prone Shoulder Row  - 1 x daily - 7 x weekly - 2 sets - 10 reps  Access Code: [email or web address]  URL: [email or web address]  Date: 12/19/2023  Prepared by: Luda Villagomez     Exercises  - Shoulder External Rotation with Anchored Resistance  - 1 x daily - 7 x weekly - 2 sets - 10 reps  - Shoulder Internal Rotation with Resistance  - 1 x daily - 7 x weekly - 2 sets - 10 reps  - Standing Shoulder Row with Anchored Resistance  - 1 x daily - 7 x weekly - 2 sets - 10 reps    Goals:  Active       PT Problem       PT Goal 1       Start:  12/18/23    Expected End:  03/17/24       1. Independent HEP to allow for 50% reduction in max ADL C/C sx ( 4/10) 2-3wks  2. 0/10 night time sx to allow for uninterrupted sleep x 1wk ( 7/7) 2-3wks  3. Survey score improvement from 9% to 0% (QDI) 3-4WKS  4. ROM increase to allow for ADL driving (full active elevation with reduced pain) 3-4wks  5. Strength increase to allow for improved ADL patient transfers " "(from 4/4+ abd/ER to 5/5 L shldr) 3-4wks         PT Goal 2       Start:  12/18/23    Expected End:  03/17/24       1. .\"I hope to have no pain after a work shift\"            "

## 2024-02-12 ENCOUNTER — TREATMENT (OUTPATIENT)
Dept: PHYSICAL THERAPY | Facility: CLINIC | Age: 44
End: 2024-02-12
Payer: COMMERCIAL

## 2024-02-12 DIAGNOSIS — M75.22 BICIPITAL TENDINITIS, LEFT SHOULDER: ICD-10-CM

## 2024-02-12 PROCEDURE — 97110 THERAPEUTIC EXERCISES: CPT | Mod: GP,CQ

## 2024-02-12 ASSESSMENT — PAIN - FUNCTIONAL ASSESSMENT: PAIN_FUNCTIONAL_ASSESSMENT: 0-10

## 2024-02-12 ASSESSMENT — PAIN SCALES - GENERAL: PAINLEVEL_OUTOF10: 0 - NO PAIN

## 2024-02-12 NOTE — PROGRESS NOTES
"Physical Therapy Treatment    Patient Name: Matt Donahue  MRN: [id number]  Today's Date: 2/12/2024  Time Calculation  Start Time: 1445  Stop Time: 1530  Time Calculation (min): 45 min      Assessment:   Patient identified by name and date of birth. Patient demonstrated good understanding of exercises. He demonstrated muscle fatigue with stabilize and reach.     Plan:  OP PT Plan  Treatment/Interventions: Cryotherapy, Dry needling, Education/ Instruction, Electrical stimulation, Hot pack, Manual therapy, Self care/ home management, Therapeutic exercises (IASTM/cupping)  PT Plan: Skilled PT  PT Frequency: 3 times per week  Duration: 4wks  Onset Date: 11/01/23  Rehab Potential: Excellent  Plan of Care Agreement: Patient  Continue with shoulder strengthening for decreased Sx and increased ease with job duties. AW  Current Problem  Problem List Items Addressed This Visit             ICD-10-CM    Bicipital tendinitis, left shoulder M75.22       Subjective Patient reported compliance with % of the time and verbalized understanding.  Patient reported fatigue without pain after treatment.     General  Reason for Referral: L shoulder biceps  Referred By: Rodrigo Leiva  Precautions  Precautions Comment: none    Pain  Pain Assessment: 0-10  Pain Score: 0 - No pain     Treatments:  Therapeutic Exercise  Therapeutic Exercise Performed: Yes  Standing UBE 3mins fwd 3mins bwd level 2   Pulley 3' total   Active ER Blue 2 x 15   Active IR Blue 2 x 15   Active ER/IR in 90 degrees 2x15 orange  Rows magenta Cords 2 x 10   Standing shoulder extension 2 x 15 magenta tube   Shoulder press with 2 x 10 magenta tube (N)  Ball on Wall x20 4#   -flex/ext,lateral, CW, CCW   Stabilize and reach 2 x 10 yellow loop band B   D1 & D2 flex 3# x10   Tapping 2 targets 3 x 30\"   BOSU [place] on elevated plinth lat,flex,CW,CCW 2 x 10   Serratus B UE lift (A)   BOSU push up on elevated plinth x 15 (X)  Prone Not time this date  -flex,ext,abd 2 " "x 10 1# (N)  -row 2 x 10 3# (N)   Not this date;   CANE THERE EX:  SA Punches 2 x 10 3# (X)  Flexion x 10 5\" Hold  Abd x 10 5\" hold   ER x 10 5\" hold   Upper trap stretch 1 x 30\" (B)  LS stretch 1 x 30\" L      Wall washing x 10 ea. Direction, (X)  Manual Therapy  Manual Therapy Performed: No  IASTM/STM to L: pecs delts UT biceps scap border (STM only)  HG9 brush J hook frame Bevel Up 0-30* (X)  PROM all planes   GH mobs posterior grade 1-2      OP EDUCATION:   Access Code: [email or web address]  URL: [email or web address]  Date: 02/09/2024  Prepared by: Myesha Schwarz     Exercises  - Prone Shoulder Horizontal Abduction  - 1 x daily - 7 x weekly - 2 sets - 10 reps  - Prone Shoulder Flexion with Dumbbell  - 1 x daily - 7 x weekly - 2 sets - 10 reps  - Prone Shoulder Extension - Single Arm  - 1 x daily - 7 x weekly - 2 sets - 10 reps  - Prone Shoulder Row  - 1 x daily - 7 x weekly - 2 sets - 10 reps  Access Code: [email or web address]  URL: [email or web address]  Date: 12/19/2023  Prepared by: Luda Villagomez     Exercises  - Shoulder External Rotation with Anchored Resistance  - 1 x daily - 7 x weekly - 2 sets - 10 reps  - Shoulder Internal Rotation with Resistance  - 1 x daily - 7 x weekly - 2 sets - 10 reps  - Standing Shoulder Row with Anchored Resistance  - 1 x daily - 7 x weekly - 2 sets - 10 reps    Goals:  Active       PT Problem       PT Goal 1       Start:  12/18/23    Expected End:  03/17/24       1. Independent HEP to allow for 50% reduction in max ADL C/C sx ( 4/10) 2-3wks  2. 0/10 night time sx to allow for uninterrupted sleep x 1wk ( 7/7) 2-3wks  3. Survey score improvement from 9% to 0% (QDI) 3-4WKS  4. ROM increase to allow for ADL driving (full active elevation with reduced pain) 3-4wks  5. Strength increase to allow for improved ADL patient transfers (from 4/4+ abd/ER to 5/5 L shldr) 3-4wks         PT Goal 2       Start:  12/18/23    Expected End:  03/17/24       1. .\"I hope to have " "no pain after a work shift\"            "

## 2024-02-14 ENCOUNTER — APPOINTMENT (OUTPATIENT)
Dept: PHYSICAL THERAPY | Facility: CLINIC | Age: 44
End: 2024-02-14
Payer: COMMERCIAL

## 2024-02-15 ENCOUNTER — APPOINTMENT (OUTPATIENT)
Dept: PHYSICAL THERAPY | Facility: CLINIC | Age: 44
End: 2024-02-15
Payer: COMMERCIAL

## 2024-03-07 DIAGNOSIS — R09.82 PND (POST-NASAL DRIP): ICD-10-CM

## 2024-03-11 RX ORDER — MONTELUKAST SODIUM 10 MG/1
10 TABLET ORAL DAILY
Qty: 90 TABLET | Refills: 1 | Status: SHIPPED | OUTPATIENT
Start: 2024-03-11 | End: 2024-03-12 | Stop reason: SDUPTHER

## 2024-03-12 ENCOUNTER — OFFICE VISIT (OUTPATIENT)
Dept: PRIMARY CARE | Facility: CLINIC | Age: 44
End: 2024-03-12
Payer: COMMERCIAL

## 2024-03-12 VITALS
SYSTOLIC BLOOD PRESSURE: 132 MMHG | DIASTOLIC BLOOD PRESSURE: 86 MMHG | WEIGHT: 226 LBS | HEIGHT: 68 IN | OXYGEN SATURATION: 98 % | HEART RATE: 75 BPM | BODY MASS INDEX: 34.25 KG/M2

## 2024-03-12 DIAGNOSIS — J45.20 MILD INTERMITTENT ASTHMA WITHOUT COMPLICATION (HHS-HCC): ICD-10-CM

## 2024-03-12 DIAGNOSIS — R73.9 HYPERGLYCEMIA: Primary | ICD-10-CM

## 2024-03-12 DIAGNOSIS — E78.2 MIXED HYPERLIPIDEMIA: ICD-10-CM

## 2024-03-12 DIAGNOSIS — R09.82 PND (POST-NASAL DRIP): ICD-10-CM

## 2024-03-12 DIAGNOSIS — F32.0 DEPRESSION, MAJOR, SINGLE EPISODE, MILD (CMS-HCC): ICD-10-CM

## 2024-03-12 PROCEDURE — 99213 OFFICE O/P EST LOW 20 MIN: CPT | Performed by: INTERNAL MEDICINE

## 2024-03-12 PROCEDURE — 1036F TOBACCO NON-USER: CPT | Performed by: INTERNAL MEDICINE

## 2024-03-12 RX ORDER — ESCITALOPRAM OXALATE 20 MG/1
20 TABLET ORAL DAILY
Qty: 90 TABLET | Refills: 1 | Status: SHIPPED | OUTPATIENT
Start: 2024-03-12

## 2024-03-12 RX ORDER — FLUTICASONE PROPIONATE AND SALMETEROL XINAFOATE 45; 21 UG/1; UG/1
2 AEROSOL, METERED RESPIRATORY (INHALATION)
Qty: 12 G | Refills: 3 | Status: SHIPPED | OUTPATIENT
Start: 2024-03-12 | End: 2024-03-14 | Stop reason: SDUPTHER

## 2024-03-12 RX ORDER — ALBUTEROL SULFATE 90 UG/1
1-2 AEROSOL, METERED RESPIRATORY (INHALATION) EVERY 6 HOURS PRN
Qty: 18 G | Refills: 3 | Status: SHIPPED | OUTPATIENT
Start: 2024-03-12 | End: 2025-03-12

## 2024-03-12 RX ORDER — MONTELUKAST SODIUM 10 MG/1
10 TABLET ORAL DAILY
Qty: 90 TABLET | Refills: 1 | Status: SHIPPED | OUTPATIENT
Start: 2024-03-12

## 2024-03-12 ASSESSMENT — ENCOUNTER SYMPTOMS
BACK PAIN: 0
FATIGUE: 0
ABDOMINAL PAIN: 0
VOMITING: 0
BLOOD IN STOOL: 0
CHEST TIGHTNESS: 0
NAUSEA: 0
DIARRHEA: 0
WHEEZING: 0
COUGH: 0
SHORTNESS OF BREATH: 0
ARTHRALGIAS: 0
PALPITATIONS: 0

## 2024-03-12 ASSESSMENT — PATIENT HEALTH QUESTIONNAIRE - PHQ9
SUM OF ALL RESPONSES TO PHQ9 QUESTIONS 1 AND 2: 0
1. LITTLE INTEREST OR PLEASURE IN DOING THINGS: NOT AT ALL
2. FEELING DOWN, DEPRESSED OR HOPELESS: NOT AT ALL

## 2024-03-12 NOTE — PATIENT INSTRUCTIONS
As we discussed we have sent refills for all of your prescription medications and please let us know if there is a issue with the pharmacy  We invite you to return in approximately 6 months and just prior to that visit we would like for you to go for fasting lab work.  We will be checking your sugar as well as your cholesterol

## 2024-03-12 NOTE — PROGRESS NOTES
Subjective   Patient ID: Matt Donahue is a 43 y.o. male who presents for Follow-up (6 MO FUV).  HPI  Today for his 6-month checkup.  Overall he appears to be doing well.  His asthma appears to be under relatively good control.  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  We also reviewed his most recent laboratory test results from late December and we mentioned that his blood glucose level was slightly raised at 105.  We also discussed doing some lab work just prior to his next follow-up visit and also checking cholesterol.  He appears to be doing well in the way of depression and I will summarize everything in a problem based format.  Review of Systems   Constitutional:  Negative for fatigue.   Respiratory:  Negative for cough, chest tightness, shortness of breath and wheezing.    Cardiovascular:  Negative for chest pain, palpitations and leg swelling.   Gastrointestinal:  Negative for abdominal pain, blood in stool, diarrhea, nausea and vomiting.   Musculoskeletal:  Negative for arthralgias and back pain.     Objective   Physical Exam  Vitals and nursing note reviewed.   Constitutional:       General: He is not in acute distress.     Appearance: Normal appearance.   HENT:      Head: Normocephalic and atraumatic.   Eyes:      Conjunctiva/sclera: Conjunctivae normal.   Cardiovascular:      Rate and Rhythm: Normal rate and regular rhythm.      Heart sounds: Normal heart sounds.   Pulmonary:      Effort: No respiratory distress.      Breath sounds: No wheezing.   Abdominal:      Palpations: Abdomen is soft.      Tenderness: There is no abdominal tenderness. There is no guarding.   Musculoskeletal:         General: No swelling. Normal range of motion.   Skin:     General: Skin is warm and dry.   Neurological:      General: No focal deficit present.      Mental Status: He is alert and oriented to person, place, and  time.   Psychiatric:         Behavior: Behavior normal.       Assessment/Plan   Problem List Items Addressed This Visit             ICD-10-CM    Depression, major, single episode, mild (CMS/HCC) F32.0     -Doing well at this time and he will continue with the escitalopram         Relevant Medications    escitalopram (Lexapro) 20 mg tablet    Mild intermittent asthma J45.20     -Stable at this time and we are providing refills on medications         Relevant Medications    albuterol 90 mcg/actuation inhaler    fluticasone propion-salmeteroL (Advair HFA) 45-21 mcg/actuation inhaler    PND (post-nasal drip) R09.82    Relevant Medications    montelukast (Singulair) 10 mg tablet    Hyperglycemia - Primary R73.9    Relevant Orders    Basic Metabolic Panel    Hemoglobin A1C    Mixed hyperlipidemia E78.2    Relevant Orders    Lipid Panel          Sandy Dias, DO

## 2024-03-14 ENCOUNTER — DOCUMENTATION (OUTPATIENT)
Dept: PHYSICAL THERAPY | Facility: CLINIC | Age: 44
End: 2024-03-14
Payer: COMMERCIAL

## 2024-03-14 DIAGNOSIS — J45.20 MILD INTERMITTENT ASTHMA WITHOUT COMPLICATION (HHS-HCC): ICD-10-CM

## 2024-03-14 RX ORDER — FLUTICASONE PROPIONATE AND SALMETEROL XINAFOATE 45; 21 UG/1; UG/1
AEROSOL, METERED RESPIRATORY (INHALATION)
Qty: 12 G | Refills: 3 | Status: SHIPPED | OUTPATIENT
Start: 2024-03-14

## 2024-03-14 NOTE — PROGRESS NOTES
Physical Therapy    Discharge Summary    Name: Matt Donahue  MRN: [id number]  : 1980  Date: 24    Discharge Summary: PT        Has been seen in clinical physical therapy beginning on 23 for 9  visit (s); there has been no further visits attended. DC from PT

## 2024-03-21 DIAGNOSIS — J45.20 MILD INTERMITTENT ASTHMA WITHOUT COMPLICATION (HHS-HCC): Primary | ICD-10-CM

## 2024-03-21 RX ORDER — BUDESONIDE AND FORMOTEROL FUMARATE DIHYDRATE 80; 4.5 UG/1; UG/1
2 AEROSOL RESPIRATORY (INHALATION)
Qty: 10.2 G | Refills: 5 | Status: SHIPPED | OUTPATIENT
Start: 2024-03-21 | End: 2025-03-21

## 2024-03-29 ENCOUNTER — HOSPITAL ENCOUNTER (OUTPATIENT)
Dept: RADIOLOGY | Facility: HOSPITAL | Age: 44
Discharge: HOME | End: 2024-03-29
Payer: COMMERCIAL

## 2024-03-29 DIAGNOSIS — M75.22 BICIPITAL TENDINITIS, LEFT SHOULDER: ICD-10-CM

## 2024-03-29 PROCEDURE — 73221 MRI JOINT UPR EXTREM W/O DYE: CPT | Mod: LT

## 2024-03-29 PROCEDURE — 73221 MRI JOINT UPR EXTREM W/O DYE: CPT | Mod: LEFT SIDE | Performed by: RADIOLOGY

## 2024-05-09 ENCOUNTER — APPOINTMENT (OUTPATIENT)
Dept: RADIOLOGY | Facility: HOSPITAL | Age: 44
End: 2024-05-09
Payer: COMMERCIAL

## 2024-05-09 ENCOUNTER — HOSPITAL ENCOUNTER (EMERGENCY)
Facility: HOSPITAL | Age: 44
Discharge: HOME | End: 2024-05-09
Attending: EMERGENCY MEDICINE
Payer: COMMERCIAL

## 2024-05-09 VITALS
HEIGHT: 68 IN | HEART RATE: 90 BPM | OXYGEN SATURATION: 98 % | DIASTOLIC BLOOD PRESSURE: 89 MMHG | SYSTOLIC BLOOD PRESSURE: 132 MMHG | WEIGHT: 225 LBS | RESPIRATION RATE: 18 BRPM | TEMPERATURE: 98.1 F | BODY MASS INDEX: 34.1 KG/M2

## 2024-05-09 DIAGNOSIS — E87.20 LACTIC ACIDEMIA: ICD-10-CM

## 2024-05-09 DIAGNOSIS — N20.0 KIDNEY STONE ON LEFT SIDE: Primary | ICD-10-CM

## 2024-05-09 LAB
ALBUMIN SERPL BCP-MCNC: 4.3 G/DL (ref 3.4–5)
ALP SERPL-CCNC: 117 U/L (ref 33–120)
ALT SERPL W P-5'-P-CCNC: 51 U/L (ref 10–52)
ANION GAP SERPL CALC-SCNC: 15 MMOL/L (ref 10–20)
APPEARANCE UR: CLEAR
AST SERPL W P-5'-P-CCNC: 36 U/L (ref 9–39)
BACTERIA #/AREA URNS AUTO: ABNORMAL /HPF
BASOPHILS # BLD AUTO: 0.04 X10*3/UL (ref 0–0.1)
BASOPHILS NFR BLD AUTO: 0.3 %
BILIRUB SERPL-MCNC: 0.6 MG/DL (ref 0–1.2)
BILIRUB UR STRIP.AUTO-MCNC: NEGATIVE MG/DL
BUN SERPL-MCNC: 16 MG/DL (ref 6–23)
CALCIUM SERPL-MCNC: 9 MG/DL (ref 8.6–10.3)
CHLORIDE SERPL-SCNC: 104 MMOL/L (ref 98–107)
CO2 SERPL-SCNC: 24 MMOL/L (ref 21–32)
COLOR UR: YELLOW
CREAT SERPL-MCNC: 1.02 MG/DL (ref 0.5–1.3)
EGFRCR SERPLBLD CKD-EPI 2021: >90 ML/MIN/1.73M*2
EOSINOPHIL # BLD AUTO: 0.06 X10*3/UL (ref 0–0.7)
EOSINOPHIL NFR BLD AUTO: 0.4 %
ERYTHROCYTE [DISTWIDTH] IN BLOOD BY AUTOMATED COUNT: 11.9 % (ref 11.5–14.5)
GLUCOSE SERPL-MCNC: 132 MG/DL (ref 74–99)
GLUCOSE UR STRIP.AUTO-MCNC: NEGATIVE MG/DL
HCT VFR BLD AUTO: 44.2 % (ref 41–52)
HGB BLD-MCNC: 15.2 G/DL (ref 13.5–17.5)
HOLD SPECIMEN: NORMAL
IMM GRANULOCYTES # BLD AUTO: 0.21 X10*3/UL (ref 0–0.7)
IMM GRANULOCYTES NFR BLD AUTO: 1.4 % (ref 0–0.9)
KETONES UR STRIP.AUTO-MCNC: ABNORMAL MG/DL
LACTATE SERPL-SCNC: 2.2 MMOL/L (ref 0.4–2)
LACTATE SERPL-SCNC: 4.4 MMOL/L (ref 0.4–2)
LEUKOCYTE ESTERASE UR QL STRIP.AUTO: NEGATIVE
LIPASE SERPL-CCNC: 27 U/L (ref 9–82)
LYMPHOCYTES # BLD AUTO: 1.44 X10*3/UL (ref 1.2–4.8)
LYMPHOCYTES NFR BLD AUTO: 9.5 %
MCH RBC QN AUTO: 30.7 PG (ref 26–34)
MCHC RBC AUTO-ENTMCNC: 34.4 G/DL (ref 32–36)
MCV RBC AUTO: 89 FL (ref 80–100)
MONOCYTES # BLD AUTO: 1.29 X10*3/UL (ref 0.1–1)
MONOCYTES NFR BLD AUTO: 8.5 %
MUCOUS THREADS #/AREA URNS AUTO: ABNORMAL /LPF
NEUTROPHILS # BLD AUTO: 12.05 X10*3/UL (ref 1.2–7.7)
NEUTROPHILS NFR BLD AUTO: 79.9 %
NITRITE UR QL STRIP.AUTO: NEGATIVE
NRBC BLD-RTO: 0 /100 WBCS (ref 0–0)
PH UR STRIP.AUTO: 7 [PH]
PLATELET # BLD AUTO: 230 X10*3/UL (ref 150–450)
POTASSIUM SERPL-SCNC: 3.7 MMOL/L (ref 3.5–5.3)
PROT SERPL-MCNC: 6.8 G/DL (ref 6.4–8.2)
PROT UR STRIP.AUTO-MCNC: ABNORMAL MG/DL
RBC # BLD AUTO: 4.95 X10*6/UL (ref 4.5–5.9)
RBC # UR STRIP.AUTO: NEGATIVE /UL
RBC #/AREA URNS AUTO: >20 /HPF
SODIUM SERPL-SCNC: 139 MMOL/L (ref 136–145)
SP GR UR STRIP.AUTO: 1.03
UROBILINOGEN UR STRIP.AUTO-MCNC: <2 MG/DL
WBC # BLD AUTO: 15.1 X10*3/UL (ref 4.4–11.3)
WBC #/AREA URNS AUTO: ABNORMAL /HPF

## 2024-05-09 PROCEDURE — 36415 COLL VENOUS BLD VENIPUNCTURE: CPT | Performed by: EMERGENCY MEDICINE

## 2024-05-09 PROCEDURE — 80053 COMPREHEN METABOLIC PANEL: CPT | Performed by: EMERGENCY MEDICINE

## 2024-05-09 PROCEDURE — 2550000001 HC RX 255 CONTRASTS: Performed by: EMERGENCY MEDICINE

## 2024-05-09 PROCEDURE — 2500000004 HC RX 250 GENERAL PHARMACY W/ HCPCS (ALT 636 FOR OP/ED): Performed by: EMERGENCY MEDICINE

## 2024-05-09 PROCEDURE — 85025 COMPLETE CBC W/AUTO DIFF WBC: CPT | Performed by: EMERGENCY MEDICINE

## 2024-05-09 PROCEDURE — 81001 URINALYSIS AUTO W/SCOPE: CPT | Performed by: EMERGENCY MEDICINE

## 2024-05-09 PROCEDURE — 96376 TX/PRO/DX INJ SAME DRUG ADON: CPT

## 2024-05-09 PROCEDURE — 99284 EMERGENCY DEPT VISIT MOD MDM: CPT | Mod: 25

## 2024-05-09 PROCEDURE — 74177 CT ABD & PELVIS W/CONTRAST: CPT

## 2024-05-09 PROCEDURE — 74177 CT ABD & PELVIS W/CONTRAST: CPT | Performed by: RADIOLOGY

## 2024-05-09 PROCEDURE — 2500000004 HC RX 250 GENERAL PHARMACY W/ HCPCS (ALT 636 FOR OP/ED)

## 2024-05-09 PROCEDURE — 96374 THER/PROPH/DIAG INJ IV PUSH: CPT | Mod: 59

## 2024-05-09 PROCEDURE — 96361 HYDRATE IV INFUSION ADD-ON: CPT

## 2024-05-09 PROCEDURE — 96375 TX/PRO/DX INJ NEW DRUG ADDON: CPT

## 2024-05-09 PROCEDURE — 83605 ASSAY OF LACTIC ACID: CPT | Performed by: EMERGENCY MEDICINE

## 2024-05-09 PROCEDURE — 83690 ASSAY OF LIPASE: CPT | Performed by: EMERGENCY MEDICINE

## 2024-05-09 RX ORDER — KETOROLAC TROMETHAMINE 30 MG/ML
15 INJECTION, SOLUTION INTRAMUSCULAR; INTRAVENOUS ONCE
Status: COMPLETED | OUTPATIENT
Start: 2024-05-09 | End: 2024-05-09

## 2024-05-09 RX ORDER — MORPHINE SULFATE 4 MG/ML
INJECTION, SOLUTION INTRAMUSCULAR; INTRAVENOUS
Status: COMPLETED
Start: 2024-05-09 | End: 2024-05-09

## 2024-05-09 RX ORDER — SODIUM CHLORIDE 9 MG/ML
150 INJECTION, SOLUTION INTRAVENOUS CONTINUOUS
Status: DISCONTINUED | OUTPATIENT
Start: 2024-05-09 | End: 2024-05-09 | Stop reason: HOSPADM

## 2024-05-09 RX ORDER — TAMSULOSIN HYDROCHLORIDE 0.4 MG/1
0.4 CAPSULE ORAL
Qty: 7 CAPSULE | Refills: 0 | Status: SHIPPED | OUTPATIENT
Start: 2024-05-09 | End: 2024-05-16

## 2024-05-09 RX ORDER — ONDANSETRON HYDROCHLORIDE 2 MG/ML
INJECTION, SOLUTION INTRAVENOUS
Status: COMPLETED
Start: 2024-05-09 | End: 2024-05-09

## 2024-05-09 RX ORDER — OXYCODONE AND ACETAMINOPHEN 5; 325 MG/1; MG/1
1 TABLET ORAL EVERY 8 HOURS PRN
Qty: 9 TABLET | Refills: 0 | Status: SHIPPED | OUTPATIENT
Start: 2024-05-09 | End: 2024-05-12

## 2024-05-09 RX ORDER — KETOROLAC TROMETHAMINE 10 MG/1
10 TABLET, FILM COATED ORAL EVERY 6 HOURS PRN
Qty: 20 TABLET | Refills: 0 | Status: SHIPPED | OUTPATIENT
Start: 2024-05-09 | End: 2024-05-14

## 2024-05-09 RX ORDER — MORPHINE SULFATE 4 MG/ML
4 INJECTION, SOLUTION INTRAMUSCULAR; INTRAVENOUS ONCE
Status: COMPLETED | OUTPATIENT
Start: 2024-05-09 | End: 2024-05-09

## 2024-05-09 RX ORDER — ONDANSETRON HYDROCHLORIDE 2 MG/ML
4 INJECTION, SOLUTION INTRAVENOUS ONCE
Status: COMPLETED | OUTPATIENT
Start: 2024-05-09 | End: 2024-05-09

## 2024-05-09 RX ORDER — ONDANSETRON 4 MG/1
4 TABLET, ORALLY DISINTEGRATING ORAL EVERY 8 HOURS PRN
Qty: 30 TABLET | Refills: 0 | Status: SHIPPED | OUTPATIENT
Start: 2024-05-09

## 2024-05-09 RX ADMIN — IOHEXOL 75 ML: 350 INJECTION, SOLUTION INTRAVENOUS at 07:31

## 2024-05-09 RX ADMIN — SODIUM CHLORIDE 150 ML/HR: 9 INJECTION, SOLUTION INTRAVENOUS at 07:01

## 2024-05-09 RX ADMIN — SODIUM CHLORIDE 1000 ML: 9 INJECTION, SOLUTION INTRAVENOUS at 10:09

## 2024-05-09 RX ADMIN — MORPHINE SULFATE 4 MG: 4 INJECTION, SOLUTION INTRAMUSCULAR; INTRAVENOUS at 06:31

## 2024-05-09 RX ADMIN — KETOROLAC TROMETHAMINE 15 MG: 30 INJECTION, SOLUTION INTRAMUSCULAR at 10:43

## 2024-05-09 RX ADMIN — SODIUM CHLORIDE 1000 ML: 9 INJECTION, SOLUTION INTRAVENOUS at 07:11

## 2024-05-09 RX ADMIN — KETOROLAC TROMETHAMINE 15 MG: 30 INJECTION, SOLUTION INTRAMUSCULAR at 07:59

## 2024-05-09 RX ADMIN — ONDANSETRON HYDROCHLORIDE 4 MG: 2 INJECTION, SOLUTION INTRAVENOUS at 06:31

## 2024-05-09 RX ADMIN — SODIUM CHLORIDE 1000 ML: 9 INJECTION, SOLUTION INTRAVENOUS at 06:31

## 2024-05-09 RX ADMIN — HYDROMORPHONE HYDROCHLORIDE 0.5 MG: 1 INJECTION, SOLUTION INTRAMUSCULAR; INTRAVENOUS; SUBCUTANEOUS at 10:13

## 2024-05-09 RX ADMIN — HYDROMORPHONE HYDROCHLORIDE 0.5 MG: 1 INJECTION, SOLUTION INTRAMUSCULAR; INTRAVENOUS; SUBCUTANEOUS at 07:08

## 2024-05-09 RX ADMIN — ONDANSETRON 4 MG: 2 INJECTION INTRAMUSCULAR; INTRAVENOUS at 06:31

## 2024-05-09 ASSESSMENT — PAIN SCALES - GENERAL
PAINLEVEL_OUTOF10: 6
PAINLEVEL_OUTOF10: 1
PAINLEVEL_OUTOF10: 9
PAINLEVEL_OUTOF10: 6
PAINLEVEL_OUTOF10: 9
PAINLEVEL_OUTOF10: 2
PAINLEVEL_OUTOF10: 5 - MODERATE PAIN
PAINLEVEL_OUTOF10: 9
PAINLEVEL_OUTOF10: 4
PAINLEVEL_OUTOF10: 6
PAINLEVEL_OUTOF10: 8
PAINLEVEL_OUTOF10: 9
PAINLEVEL_OUTOF10: 9
PAINLEVEL_OUTOF10: 4

## 2024-05-09 ASSESSMENT — PAIN DESCRIPTION - ORIENTATION
ORIENTATION: LEFT
ORIENTATION_2: LEFT
ORIENTATION: LEFT

## 2024-05-09 ASSESSMENT — PAIN DESCRIPTION - DESCRIPTORS: DESCRIPTORS: ACHING

## 2024-05-09 ASSESSMENT — PAIN DESCRIPTION - PAIN TYPE
TYPE: ACUTE PAIN

## 2024-05-09 ASSESSMENT — PAIN DESCRIPTION - LOCATION
LOCATION_2: BACK
LOCATION: ABDOMEN

## 2024-05-09 ASSESSMENT — COLUMBIA-SUICIDE SEVERITY RATING SCALE - C-SSRS
2. HAVE YOU ACTUALLY HAD ANY THOUGHTS OF KILLING YOURSELF?: NO
6. HAVE YOU EVER DONE ANYTHING, STARTED TO DO ANYTHING, OR PREPARED TO DO ANYTHING TO END YOUR LIFE?: NO
1. IN THE PAST MONTH, HAVE YOU WISHED YOU WERE DEAD OR WISHED YOU COULD GO TO SLEEP AND NOT WAKE UP?: NO

## 2024-05-09 ASSESSMENT — PAIN DESCRIPTION - FREQUENCY: FREQUENCY: CONSTANT/CONTINUOUS

## 2024-05-09 ASSESSMENT — PAIN - FUNCTIONAL ASSESSMENT
PAIN_FUNCTIONAL_ASSESSMENT: 0-10

## 2024-05-09 ASSESSMENT — VISUAL ACUITY: OU: 1

## 2024-05-09 ASSESSMENT — PAIN DESCRIPTION - ONSET: ONSET: PROGRESSIVE

## 2024-05-09 NOTE — ED PROVIDER NOTES
Abdominal pain, left flank pain, nausea, vomiting, loose stools.  This 43-year-old white male presents to the ED with complaint of abdominal pain symptoms that began at 3 AM this morning he states that his symptoms have progressively got worse and now has developed some left flank pain.  States that he had nausea with 1 episode of vomiting with some improvement for about 20 minutes before his symptoms got worse.  He admits to having loose stools but states that he has been having loose stools since he was treated with Augmentin for an upper respiratory infection and also was steroids.  He denies any previous history of kidney stones or urinary symptoms.  He does admit to previous appendectomy.      History provided by:  Patient   used: No         Physical Exam  Nursing note reviewed.   Constitutional:       General: He is awake.      Appearance: Normal appearance. He is overweight. He is ill-appearing.   HENT:      Head: Normocephalic and atraumatic.      Right Ear: Hearing and external ear normal.      Left Ear: Hearing and external ear normal.      Nose: Nose normal. No congestion or rhinorrhea.      Mouth/Throat:      Lips: Pink.      Mouth: Mucous membranes are moist.      Pharynx: Oropharynx is clear. Uvula midline. No oropharyngeal exudate or posterior oropharyngeal erythema.   Eyes:      General: Lids are normal. Vision grossly intact.         Right eye: No discharge.         Left eye: No discharge.      Extraocular Movements: Extraocular movements intact.      Conjunctiva/sclera: Conjunctivae normal.      Pupils: Pupils are equal, round, and reactive to light.   Cardiovascular:      Rate and Rhythm: Normal rate and regular rhythm.      Pulses: Normal pulses.      Heart sounds: Normal heart sounds. No murmur heard.     No friction rub. No gallop.   Pulmonary:      Effort: Pulmonary effort is normal. No respiratory distress.      Breath sounds: Normal breath sounds. No stridor. No wheezing,  rhonchi or rales.   Chest:      Chest wall: No tenderness.   Abdominal:      General: Abdomen is flat. Bowel sounds are decreased. There is no distension.      Palpations: Abdomen is soft. There is no mass.      Tenderness: There is abdominal tenderness in the right upper quadrant, epigastric area and left upper quadrant. There is left CVA tenderness. There is no right CVA tenderness, guarding or rebound.      Hernia: No hernia is present.       Musculoskeletal:         General: No swelling, tenderness, deformity or signs of injury. Normal range of motion.      Cervical back: Full passive range of motion without pain, normal range of motion and neck supple.      Right lower leg: Normal. No edema.      Left lower leg: Normal. No edema.   Skin:     General: Skin is warm and dry.      Capillary Refill: Capillary refill takes less than 2 seconds.      Coloration: Skin is not jaundiced or pale.      Findings: No bruising, erythema, lesion or rash.   Neurological:      General: No focal deficit present.      Mental Status: He is alert and oriented to person, place, and time.      GCS: GCS eye subscore is 4. GCS verbal subscore is 5. GCS motor subscore is 6.      Cranial Nerves: Cranial nerves 2-12 are intact. No cranial nerve deficit.      Sensory: Sensation is intact. No sensory deficit.      Motor: Motor function is intact. No weakness.      Coordination: Coordination is intact. Coordination normal.      Gait: Gait is intact.      Deep Tendon Reflexes: Reflexes normal.   Psychiatric:         Attention and Perception: Attention and perception normal.         Mood and Affect: Mood and affect normal.         Speech: Speech normal.         Behavior: Behavior normal. Behavior is cooperative.         Thought Content: Thought content normal.         Cognition and Memory: Cognition and memory normal.         Judgment: Judgment normal.          Labs Reviewed   LACTATE - Abnormal       Result Value    Lactate 4.4 (*)      Narrative:     Venipuncture immediately after or during the administration of Metamizole may lead to falsely low results. Testing should be performed immediately  prior to Metamizole dosing.   COMPREHENSIVE METABOLIC PANEL - Abnormal    Glucose 132 (*)     Sodium 139      Potassium 3.7      Chloride 104      Bicarbonate 24      Anion Gap 15      Urea Nitrogen 16      Creatinine 1.02      eGFR >90      Calcium 9.0      Albumin 4.3      Alkaline Phosphatase 117      Total Protein 6.8      AST 36      Bilirubin, Total 0.6      ALT 51     CBC WITH AUTO DIFFERENTIAL - Abnormal    WBC 15.1 (*)     nRBC 0.0      RBC 4.95      Hemoglobin 15.2      Hematocrit 44.2      MCV 89      MCH 30.7      MCHC 34.4      RDW 11.9      Platelets 230      Neutrophils % 79.9      Immature Granulocytes %, Automated 1.4 (*)     Lymphocytes % 9.5      Monocytes % 8.5      Eosinophils % 0.4      Basophils % 0.3      Neutrophils Absolute 12.05 (*)     Immature Granulocytes Absolute, Automated 0.21      Lymphocytes Absolute 1.44      Monocytes Absolute 1.29 (*)     Eosinophils Absolute 0.06      Basophils Absolute 0.04     LIPASE - Normal    Lipase 27      Narrative:     Venipuncture immediately after or during the administration of Metamizole may lead to falsely low results. Testing should be performed immediately prior to Metamizole dosing.   URINALYSIS WITH REFLEX CULTURE AND MICROSCOPIC    Narrative:     The following orders were created for panel order Urinalysis with Reflex Culture and Microscopic.  Procedure                               Abnormality         Status                     ---------                               -----------         ------                     Urinalysis with Reflex C...[[id number]]                                                 Extra Urine Gray Tube[[id number]]                                                         Please view results for these tests on the individual orders.   URINALYSIS WITH REFLEX CULTURE AND  MICROSCOPIC   EXTRA URINE GRAY TUBE   LACTATE        CT abdomen pelvis w IV contrast    (Results Pending)        Procedures     Medical Decision Making  Patient was seen and evaluated due to complaints of abdominal pain across upper quadrants and into the left flank area.  Patient also had an episode of nausea vomiting and has had loose stools since he was treated with Augmentin.  Because of the abdominal pain symptoms he was ordered blood work, urinalysis and a CT scan of the abdomen pelvis.  Patient care was turned over to the oncoming ED attending at 7 AM.                           Carl Ayers DO  05/09/24 0656

## 2024-05-09 NOTE — PROGRESS NOTES
Emergency Medicine Transition of Care Note.    I received Matt Donahue in signout from Dr. Ayers.  Please see the previous ED provider note for all HPI, PE and MDM up to the time of signout at 0700. This is in addition to the primary record.    In brief Matt Donahue is an 43 y.o. male presenting for   Chief Complaint   Patient presents with    Nausea    Vomiting    Diarrhea     Pt comes in for N/V/D x 3 hrs. Pt states the last 3 hrs he has been having N/V/D and is also experiecing lower left back, abdominal aching.      At the time of signout we were awaiting: CT of the abdomen pelvis    Diagnoses as of 05/09/24 1115   Kidney stone on left side   Lactic acidemia       Medical Decision Making  Patient appears well nontoxic.  Patient has been treated with morphine, Zofran, 1 L normal saline.  Continue to have pain and was treated with Dilaudid as well as ketorolac.  Patient was given a total of 2 L of normal saline and then on repeat lactic acid is 2.2.  Patient be given another 1 L normal saline as well as another dose of Dilaudid.  CT shows evidence of a distal 2 mm kidney stone.  No evidence of urinary tract infection.  Patient feeling improved and will be given ketorolac, Percocet, Flomax, Zofran for home.  Given primary care as well as urology follow-up.  Stable at time of discharge.    Final diagnoses:   [N20.0] Kidney stone on left side   [E87.20] Lactic acidemia           Procedure  Procedures    Roberto Carlos Dorado DO

## 2024-05-31 ASSESSMENT — ENCOUNTER SYMPTOMS
EYES NEGATIVE: 1
ENDOCRINE NEGATIVE: 1
CHILLS: 0
PSYCHIATRIC NEGATIVE: 1
NAUSEA: 0
SHORTNESS OF BREATH: 0
ALLERGIC/IMMUNOLOGIC NEGATIVE: 1
DIFFICULTY URINATING: 0
FEVER: 0
COUGH: 0

## 2024-05-31 NOTE — PROGRESS NOTES
Subjective   Patient ID: Matt Donahue is a 43 y.o. male.    HPI  Patient is here to establish for kidney stones. He was seen in ER on 5/9 for left flank pain. . CT was done that showed 2mm distal left ureteral stone. He has not had a previous hx of kidney stones..Patient states he did pass the stone. No issues since.. No hematuria, No dysuria.. Nocturia x 1      Review of Systems   Constitutional:  Negative for chills and fever.   HENT: Negative.     Eyes: Negative.    Respiratory:  Negative for cough and shortness of breath.    Cardiovascular:  Negative for chest pain and leg swelling.   Gastrointestinal:  Negative for nausea.   Endocrine: Negative.    Genitourinary:  Negative for difficulty urinating.        Negative except for documented in HPI   Allergic/Immunologic: Negative.    Neurological:         Alert & oriented X 3   Hematological:         Denies blood thinners   Psychiatric/Behavioral: Negative.         Objective   Physical Exam  Vitals and nursing note reviewed.   Constitutional:       General: He is not in acute distress.     Appearance: Normal appearance.   Pulmonary:      Effort: Pulmonary effort is normal.   Abdominal:      Tenderness: There is no abdominal tenderness.   Genitourinary:     Comments: Kidneys non palpable bilaterally  Bladder non palpable or tender  Scrotum no mass, No hydrocele  Epididymis- No spermatocele. Non Tender.  Testicles: No mass. WNL  Urethra: No discharge  Penis within normal limits... No lesions. uncircumcised  Prostate - symmetric, no nodules. BENIGN  Seminal Vesicals: No mass.  Sphincter tone: normal  Neurological:      Mental Status: He is alert.         Assessment/Plan   Diagnoses and all orders for this visit:  BPH without obstruction/lower urinary tract symptoms  Kidney stone  Nocturia    All available PSA values reviewed, Options discussed. Questions answered.  PSA ordered   Diet changes for prostate health discussed and educational information given. Pros/Cons  of prostate health supplements discussed.   Treatment options for LUTS reviewed  Discussed timed voiding. Discussed fluid and caffeine intake  Treatment options for ED reviewed-This is not an issue  Lifestyle change to help prevent UTIs discussed. Encouraged fluid intake.  UA reviewed-Will repeat given microhematuria  CT and ER notes reviewed  KUB ordered  Stone prevention discussed. Diet reviewed. Discussed fluid intake  BMP reviewed      F/U  6 months with PSA and KUB and repeat UA

## 2024-06-03 ENCOUNTER — OFFICE VISIT (OUTPATIENT)
Dept: UROLOGY | Facility: CLINIC | Age: 44
End: 2024-06-03
Payer: COMMERCIAL

## 2024-06-03 VITALS — BODY MASS INDEX: 27.89 KG/M2 | HEIGHT: 68 IN | RESPIRATION RATE: 16 BRPM | WEIGHT: 184 LBS

## 2024-06-03 DIAGNOSIS — N20.0 KIDNEY STONE: ICD-10-CM

## 2024-06-03 DIAGNOSIS — N40.0 BPH WITHOUT OBSTRUCTION/LOWER URINARY TRACT SYMPTOMS: Primary | ICD-10-CM

## 2024-06-03 DIAGNOSIS — R35.1 NOCTURIA: ICD-10-CM

## 2024-06-03 PROCEDURE — 99204 OFFICE O/P NEW MOD 45 MIN: CPT | Performed by: UROLOGY

## 2024-06-03 PROCEDURE — 1036F TOBACCO NON-USER: CPT | Performed by: UROLOGY

## 2024-09-10 ENCOUNTER — APPOINTMENT (OUTPATIENT)
Dept: PRIMARY CARE | Facility: CLINIC | Age: 44
End: 2024-09-10
Payer: COMMERCIAL

## 2024-09-23 DIAGNOSIS — J45.20 MILD INTERMITTENT ASTHMA WITHOUT COMPLICATION (HHS-HCC): ICD-10-CM

## 2024-09-24 RX ORDER — BUDESONIDE AND FORMOTEROL FUMARATE DIHYDRATE 80; 4.5 UG/1; UG/1
AEROSOL RESPIRATORY (INHALATION)
Qty: 10.2 G | Refills: 5 | Status: SHIPPED | OUTPATIENT
Start: 2024-09-24

## 2024-10-29 DIAGNOSIS — F32.0 DEPRESSION, MAJOR, SINGLE EPISODE, MILD (CMS-HCC): ICD-10-CM

## 2024-10-29 RX ORDER — ESCITALOPRAM OXALATE 20 MG/1
20 TABLET ORAL DAILY
Qty: 90 TABLET | Refills: 0 | Status: SHIPPED | OUTPATIENT
Start: 2024-10-29

## 2024-12-09 ENCOUNTER — APPOINTMENT (OUTPATIENT)
Dept: UROLOGY | Facility: CLINIC | Age: 44
End: 2024-12-09
Payer: COMMERCIAL

## 2025-01-28 DIAGNOSIS — F32.0 DEPRESSION, MAJOR, SINGLE EPISODE, MILD (CMS-HCC): ICD-10-CM

## 2025-02-12 ENCOUNTER — OFFICE VISIT (OUTPATIENT)
Age: 45
End: 2025-02-12
Payer: COMMERCIAL

## 2025-02-12 VITALS
DIASTOLIC BLOOD PRESSURE: 82 MMHG | OXYGEN SATURATION: 98 % | WEIGHT: 231.7 LBS | SYSTOLIC BLOOD PRESSURE: 126 MMHG | BODY MASS INDEX: 35.12 KG/M2 | HEART RATE: 86 BPM | HEIGHT: 68 IN

## 2025-02-12 DIAGNOSIS — R09.82 PND (POST-NASAL DRIP): ICD-10-CM

## 2025-02-12 DIAGNOSIS — F32.0 DEPRESSION, MAJOR, SINGLE EPISODE, MILD (CMS-HCC): ICD-10-CM

## 2025-02-12 DIAGNOSIS — J45.20 MILD INTERMITTENT ASTHMA WITHOUT COMPLICATION (HHS-HCC): ICD-10-CM

## 2025-02-12 DIAGNOSIS — E78.2 MIXED HYPERLIPIDEMIA: ICD-10-CM

## 2025-02-12 DIAGNOSIS — R73.9 HYPERGLYCEMIA: Primary | ICD-10-CM

## 2025-02-12 PROCEDURE — 99214 OFFICE O/P EST MOD 30 MIN: CPT | Performed by: INTERNAL MEDICINE

## 2025-02-12 PROCEDURE — 1036F TOBACCO NON-USER: CPT | Performed by: INTERNAL MEDICINE

## 2025-02-12 PROCEDURE — 3008F BODY MASS INDEX DOCD: CPT | Performed by: INTERNAL MEDICINE

## 2025-02-12 RX ORDER — MONTELUKAST SODIUM 10 MG/1
10 TABLET ORAL DAILY
Qty: 100 TABLET | Refills: 1 | Status: SHIPPED | OUTPATIENT
Start: 2025-02-12

## 2025-02-12 RX ORDER — BUDESONIDE AND FORMOTEROL FUMARATE DIHYDRATE 80; 4.5 UG/1; UG/1
2 AEROSOL RESPIRATORY (INHALATION)
Qty: 10.2 G | Refills: 5 | Status: SHIPPED | OUTPATIENT
Start: 2025-02-12

## 2025-02-12 RX ORDER — ESCITALOPRAM OXALATE 20 MG/1
20 TABLET ORAL DAILY
Qty: 100 TABLET | Refills: 1 | Status: SHIPPED | OUTPATIENT
Start: 2025-02-12

## 2025-02-12 ASSESSMENT — PATIENT HEALTH QUESTIONNAIRE - PHQ9
1. LITTLE INTEREST OR PLEASURE IN DOING THINGS: NOT AT ALL
SUM OF ALL RESPONSES TO PHQ9 QUESTIONS 1 AND 2: 0
2. FEELING DOWN, DEPRESSED OR HOPELESS: NOT AT ALL

## 2025-02-12 ASSESSMENT — ENCOUNTER SYMPTOMS
VOMITING: 0
SHORTNESS OF BREATH: 1
BLOOD IN STOOL: 0
NAUSEA: 0
BACK PAIN: 0
DIARRHEA: 0
WHEEZING: 0
COUGH: 0
FATIGUE: 0
ABDOMINAL PAIN: 0
ARTHRALGIAS: 0
PALPITATIONS: 0

## 2025-02-12 NOTE — ASSESSMENT & PLAN NOTE
-He will get a fasting glucose this morning with hemoglobin A1c and I will contact him with results

## 2025-02-12 NOTE — PROGRESS NOTES
Subjective   Patient ID: Matt Dnoahue is a 44 y.o. male who presents for Annual Exam.  HPI  He is here today for his checkup.  He is having some mild respiratory symptoms but he ran out of his Singulair.  We will provide refills today and he will let me know if his respiratory symptoms are not well-controlled.  We did conduct a full review of systems and we discussed checking a glucose and cholesterol because it has been sometime since he was evaluated.  We are also providing refills on medications and I will summarize everything in a problem based format.  Review of Systems   Constitutional:  Negative for fatigue.   Respiratory:  Positive for shortness of breath. Negative for cough and wheezing.    Cardiovascular:  Negative for chest pain, palpitations and leg swelling.   Gastrointestinal:  Negative for abdominal pain, blood in stool, diarrhea, nausea and vomiting.   Musculoskeletal:  Negative for arthralgias and back pain.     Objective   Physical Exam  Vitals and nursing note reviewed.   Constitutional:       General: He is not in acute distress.     Appearance: Normal appearance.   HENT:      Head: Normocephalic and atraumatic.   Eyes:      Conjunctiva/sclera: Conjunctivae normal.   Cardiovascular:      Rate and Rhythm: Normal rate and regular rhythm.      Heart sounds: Normal heart sounds.   Pulmonary:      Effort: No respiratory distress.      Breath sounds: No wheezing.   Abdominal:      Palpations: Abdomen is soft.      Tenderness: There is no abdominal tenderness. There is no guarding.   Musculoskeletal:         General: No swelling. Normal range of motion.   Skin:     General: Skin is warm and dry.   Neurological:      General: No focal deficit present.      Mental Status: He is alert and oriented to person, place, and time.   Psychiatric:         Behavior: Behavior normal.       Assessment/Plan   Problem List Items Addressed This Visit             ICD-10-CM    Depression, major, single episode, mild  (CMS-[facility]) F32.0     -He reports feeling well on his current medical regimen and we are providing refills today  -We will continue to monitor by seeing him twice a year         Relevant Medications    escitalopram (Lexapro) 20 mg tablet    Mild intermittent asthma J45.20     -We are providing refills on all of his medications and he will contact us if he is not doing well with his asthma         Relevant Medications    budesonide-formoteroL (Symbicort) 80-4.5 mcg/actuation inhaler    PND (post-nasal drip) R09.82    Relevant Medications    montelukast (Singulair) 10 mg tablet    Hyperglycemia - Primary R73.9     -He will get a fasting glucose this morning with hemoglobin A1c and I will contact him with results         Relevant Orders    Basic Metabolic Panel    Hemoglobin A1C    Mixed hyperlipidemia E78.2     -He will get a fasting lipid profile drawn this morning and I will contact him with results         Relevant Orders    Lipid Panel   Patient instructions  As we discussed we will have you stop at the lab before leaving today to submit a blood specimen so that we can check your sugar and your cholesterol.  Once the results are known I will contact you  We also sent refills for all of your medications and please let us know if your asthma is not doing well  We will see you back in approximately 6 months and certainly sooner if any problems       Sandy Dias, DO

## 2025-02-12 NOTE — ASSESSMENT & PLAN NOTE
-He reports feeling well on his current medical regimen and we are providing refills today  -We will continue to monitor by seeing him twice a year

## 2025-02-12 NOTE — PATIENT INSTRUCTIONS
Patient instructions  As we discussed we will have you stop at the lab before leaving today to submit a blood specimen so that we can check your sugar and your cholesterol.  Once the results are known I will contact you  We also sent refills for all of your medications and please let us know if your asthma is not doing well  We will see you back in approximately 6 months and certainly sooner if any problems

## 2025-02-12 NOTE — ASSESSMENT & PLAN NOTE
-We are providing refills on all of his medications and he will contact us if he is not doing well with his asthma

## 2025-02-13 LAB — PSA SERPL-MCNC: 0.88 NG/ML

## 2025-02-20 DIAGNOSIS — E78.2 MIXED HYPERLIPIDEMIA: Primary | ICD-10-CM

## 2025-02-20 RX ORDER — ATORVASTATIN CALCIUM 20 MG/1
20 TABLET, FILM COATED ORAL DAILY
Qty: 30 TABLET | Refills: 3 | Status: SHIPPED | OUTPATIENT
Start: 2025-02-20 | End: 2026-03-27

## 2025-04-14 RX ORDER — ESCITALOPRAM OXALATE 20 MG/1
20 TABLET ORAL DAILY
Qty: 30 TABLET | OUTPATIENT
Start: 2025-04-14

## 2025-04-21 DIAGNOSIS — E78.2 MIXED HYPERLIPIDEMIA: ICD-10-CM

## 2025-05-05 ENCOUNTER — OFFICE VISIT (OUTPATIENT)
Dept: URGENT CARE | Facility: CLINIC | Age: 45
End: 2025-05-05
Payer: COMMERCIAL

## 2025-05-05 VITALS
SYSTOLIC BLOOD PRESSURE: 113 MMHG | TEMPERATURE: 98.1 F | DIASTOLIC BLOOD PRESSURE: 82 MMHG | HEART RATE: 77 BPM | OXYGEN SATURATION: 94 % | RESPIRATION RATE: 16 BRPM

## 2025-05-05 DIAGNOSIS — L03.032 PARONYCHIA, TOE, LEFT: Primary | ICD-10-CM

## 2025-05-05 PROCEDURE — 99213 OFFICE O/P EST LOW 20 MIN: CPT | Performed by: NURSE PRACTITIONER

## 2025-05-05 RX ORDER — SULFAMETHOXAZOLE AND TRIMETHOPRIM 800; 160 MG/1; MG/1
1 TABLET ORAL 2 TIMES DAILY
Qty: 14 TABLET | Refills: 0 | Status: SHIPPED | OUTPATIENT
Start: 2025-05-05 | End: 2025-05-12

## 2025-05-05 NOTE — PROGRESS NOTES
[facility]   МАРИНА NOTE:      Name: Matt Donahue, 44 y.o.    CSN:[id number]   MRN:[id number]      ALL:  Allergies[1]      Chief Complaint: L toe pain (X 72 hours)    Encounter Date: 5/5/2025      HPI: The history was obtained from the patient. Matt is a 44 y.o. male, who presents with a chief complaint of left great toe pain that began 3 days ago. Attempted to cut the toenail for relief pain believing he has an ingrown toenail, symptoms unchanged. He went to his daughters graduation which requires walking many miles. Denies fever, chills, body aches, drainage from sight.    PMHx:    Medical History[2]        Current Medications[3]      PMSx:  Surgical History[4]    Fam Hx: Family History[5]    SOC. Hx:     Social History     Socioeconomic History    Marital status:      Spouse name: Not on file    Number of children: Not on file    Years of education: Not on file    Highest education level: Not on file   Occupational History    Not on file   Tobacco Use    Smoking status: Never     Passive exposure: Never    Smokeless tobacco: Never   Vaping Use    Vaping status: Never Used   Substance and Sexual Activity    Alcohol use: Yes     Alcohol/week: 2.0 standard drinks of alcohol     Types: 2 Cans of beer per week    Drug use: Not Currently    Sexual activity: Defer   Other Topics Concern    Not on file   Social History Narrative    Not on file     Social Drivers of Health     Financial Resource Strain: Not on file   Food Insecurity: Not on file   Transportation Needs: Not on file   Physical Activity: Not on file   Stress: Not on file   Social Connections: Not on file   Intimate Partner Violence: Not on file   Housing Stability: Not on file         Vitals:    05/05/25 1039   BP: 113/82   Pulse: 77   Resp: 16   Temp: 36.7 °C (98.1 °F)   SpO2: 94%                Physical Exam  Vitals and nursing note reviewed.   Constitutional:       General: He is not in acute distress.      Appearance: Normal appearance. He is not ill-appearing.   HENT:      Head: Normocephalic and atraumatic.      Mouth/Throat:      Mouth: Mucous membranes are moist.   Cardiovascular:      Rate and Rhythm: Normal rate and regular rhythm.      Heart sounds: Normal heart sounds.   Pulmonary:      Effort: Pulmonary effort is normal.      Breath sounds: Normal breath sounds.   Abdominal:      General: Bowel sounds are normal.   Musculoskeletal:        Feet:    Skin:     General: Skin is warm and dry.      Capillary Refill: Capillary refill takes less than 2 seconds.   Neurological:      Mental Status: He is alert and oriented to person, place, and time.           LABORATORY @ RADIOLOGICAL IMAGING (if done):     Declines x-ray/labs to rule out gout  ____________________________________________________________________    I did personally review Matt's past medical history, surgical history, social history, as well as family history (when relevant).  In this case, I also oversaw the his drug management by reviewing his medication list, allergy list, as well as the medications that I prescribed during the UC course and/or recommended as an out-patient (including possible OTC medications such as acetaminophen, NSAIDs , etc).    After reviewing the items above, I did look at previous medical documentation, such as recent hospitalizations, office visits, and/or recent consultations with PCP/specialist.                          SDOH:   Another factor that I considered in Matt's care was his Social Determinants of Health (SDOH). During this UC encounter, he did not have social determinants of health. Those SDOH influencing Matt's care are: none      _____________________________________________________________________      UC COURSE/MEDICAL DECISION MAKING:    Matt is a 44 y.o., who presents with a working diagnosis of   1. Paronychia, toe, left        Plan of Care:  1) Bactrim DS x 7 days, if no improvement  recommend podiatry  2) Continue over-the-counter supportive care  3) Return to urgent care, primary care provider, or emergency department with worsening symptoms       No red flags on exam. Plan of care reviewed with patient, agreeable to discharge      IGOR Kay DNP   Advanced Practice Provider  [facility]    Please note: While the patient may or may not have received printed discharge paperwork, all relevant medical findings, test results, and treatment details are accessible through the electronic medical record system. The patient is encouraged to review their chart via the patient portal for comprehensive information and follow-up instructions.       [1]   Allergies  Allergen Reactions    Clarithromycin GI Upset    Latex Rash    Levaquin [Levofloxacin] Confusion   [2]   Past Medical History:  Diagnosis Date    Anxiety     Arthritis     Asthma     Depression    [3]   Current Outpatient Medications   Medication Sig Dispense Refill    atorvastatin (Lipitor) 20 mg tablet Take 1 tablet (20 mg) by mouth once daily. 30 tablet 3    budesonide-formoteroL (Symbicort) 80-4.5 mcg/actuation inhaler Inhale 2 puffs 2 times a day. Rinse mouth with water after use to reduce aftertaste and incidence of candidiasis. Do not swallow. 10.2 g 5    cholecalciferol (Vitamin D3) 5,000 Units tablet Take 1 tablet (5,000 Units) by mouth once daily.      escitalopram (Lexapro) 20 mg tablet Take 1 tablet (20 mg) by mouth once daily. 100 tablet 1    fluticasone (Flonase) 50 mcg/actuation nasal spray Administer 1-2 sprays into each nostril once daily as needed. Shake gently. Before first use, prime pump. After use, clean tip and replace cap.      montelukast (Singulair) 10 mg tablet Take 1 tablet (10 mg) by mouth once daily. 100 tablet 1    albuterol 90 mcg/actuation inhaler Inhale 1-2 puffs every 6 hours if needed for wheezing. 18 g 3    sulfamethoxazole-trimethoprim (Bactrim DS) 800-160 mg tablet Take 1  tablet by mouth 2 times a day for 7 days. 14 tablet 0     No current facility-administered medications for this visit.   [4]   Past Surgical History:  Procedure Laterality Date    APPENDECTOMY  1996    COLONOSCOPY      KIDNEY STONE SURGERY  05/2024    OTHER SURGICAL HISTORY  10/31/2019    Appendectomy   [5]   Family History  Problem Relation Name Age of Onset    Arthritis Mother Sharon     Hypertension Mother Sharon     Asthma Mother Sharon     Hyperlipidemia Mother Sharon     Arthritis Father Chirag     Hypertension Father Chirag     Hyperlipidemia Father Chirag     Heart attack Father Chirag

## 2025-06-29 DIAGNOSIS — E78.2 MIXED HYPERLIPIDEMIA: ICD-10-CM

## 2025-07-07 RX ORDER — ATORVASTATIN CALCIUM 20 MG/1
20 TABLET, FILM COATED ORAL DAILY
Qty: 30 TABLET | Refills: 1 | Status: SHIPPED | OUTPATIENT
Start: 2025-07-07

## 2025-08-13 ENCOUNTER — APPOINTMENT (OUTPATIENT)
Age: 45
End: 2025-08-13
Payer: COMMERCIAL

## 2025-08-13 VITALS
BODY MASS INDEX: 35.72 KG/M2 | OXYGEN SATURATION: 98 % | WEIGHT: 235.7 LBS | HEIGHT: 68 IN | SYSTOLIC BLOOD PRESSURE: 126 MMHG | HEART RATE: 84 BPM | DIASTOLIC BLOOD PRESSURE: 82 MMHG

## 2025-08-13 DIAGNOSIS — R09.82 PND (POST-NASAL DRIP): ICD-10-CM

## 2025-08-13 DIAGNOSIS — E78.2 MIXED HYPERLIPIDEMIA: ICD-10-CM

## 2025-08-13 DIAGNOSIS — J45.20 MILD INTERMITTENT ASTHMA WITHOUT COMPLICATION (HHS-HCC): ICD-10-CM

## 2025-08-13 DIAGNOSIS — E66.812 CLASS 2 OBESITY DUE TO EXCESS CALORIES WITHOUT SERIOUS COMORBIDITY WITH BODY MASS INDEX (BMI) OF 35.0 TO 35.9 IN ADULT: Primary | ICD-10-CM

## 2025-08-13 DIAGNOSIS — R73.9 HYPERGLYCEMIA: ICD-10-CM

## 2025-08-13 DIAGNOSIS — E66.09 CLASS 2 OBESITY DUE TO EXCESS CALORIES WITHOUT SERIOUS COMORBIDITY WITH BODY MASS INDEX (BMI) OF 35.0 TO 35.9 IN ADULT: Primary | ICD-10-CM

## 2025-08-13 DIAGNOSIS — F32.0 DEPRESSION, MAJOR, SINGLE EPISODE, MILD: ICD-10-CM

## 2025-08-13 PROBLEM — R69 TAKING MEDICATION FOR CHRONIC DISEASE: Status: RESOLVED | Noted: 2023-01-16 | Resolved: 2025-08-13

## 2025-08-13 PROCEDURE — 1036F TOBACCO NON-USER: CPT | Performed by: INTERNAL MEDICINE

## 2025-08-13 PROCEDURE — 3008F BODY MASS INDEX DOCD: CPT | Performed by: INTERNAL MEDICINE

## 2025-08-13 PROCEDURE — 99214 OFFICE O/P EST MOD 30 MIN: CPT | Performed by: INTERNAL MEDICINE

## 2025-08-13 RX ORDER — MONTELUKAST SODIUM 10 MG/1
10 TABLET ORAL DAILY
Qty: 100 TABLET | Refills: 1 | Status: SHIPPED | OUTPATIENT
Start: 2025-08-13

## 2025-08-13 RX ORDER — ESCITALOPRAM OXALATE 20 MG/1
20 TABLET ORAL DAILY
Qty: 100 TABLET | Refills: 1 | Status: SHIPPED | OUTPATIENT
Start: 2025-08-13

## 2025-08-13 RX ORDER — BUDESONIDE AND FORMOTEROL FUMARATE DIHYDRATE 80; 4.5 UG/1; UG/1
2 AEROSOL RESPIRATORY (INHALATION)
Qty: 10.2 G | Refills: 5 | Status: SHIPPED | OUTPATIENT
Start: 2025-08-13

## 2025-08-13 RX ORDER — ALBUTEROL SULFATE 90 UG/1
1-2 INHALANT RESPIRATORY (INHALATION) EVERY 6 HOURS PRN
Qty: 18 G | Refills: 3 | Status: SHIPPED | OUTPATIENT
Start: 2025-08-13 | End: 2026-08-13

## 2025-08-13 RX ORDER — ATORVASTATIN CALCIUM 20 MG/1
20 TABLET, FILM COATED ORAL DAILY
Qty: 30 TABLET | Refills: 1 | Status: SHIPPED | OUTPATIENT
Start: 2025-08-13

## 2025-08-13 ASSESSMENT — ENCOUNTER SYMPTOMS
SHORTNESS OF BREATH: 0
ABDOMINAL PAIN: 0
FATIGUE: 0
BACK PAIN: 0
COUGH: 0
NAUSEA: 0
WHEEZING: 0
DIARRHEA: 0
ARTHRALGIAS: 0
PALPITATIONS: 0
BLOOD IN STOOL: 0
VOMITING: 0

## 2025-08-14 LAB
ALBUMIN SERPL-MCNC: 4.4 G/DL (ref 3.6–5.1)
ALP SERPL-CCNC: 90 U/L (ref 36–130)
ALT SERPL-CCNC: 22 U/L (ref 9–46)
ANION GAP SERPL CALCULATED.4IONS-SCNC: 10 MMOL/L (CALC) (ref 7–17)
AST SERPL-CCNC: 17 U/L (ref 10–40)
BILIRUB SERPL-MCNC: 0.6 MG/DL (ref 0.2–1.2)
BUN SERPL-MCNC: 12 MG/DL (ref 7–25)
CALCIUM SERPL-MCNC: 9.3 MG/DL (ref 8.6–10.3)
CHLORIDE SERPL-SCNC: 106 MMOL/L (ref 98–110)
CHOLEST SERPL-MCNC: 247 MG/DL
CHOLEST/HDLC SERPL: 5.5 (CALC)
CO2 SERPL-SCNC: 25 MMOL/L (ref 20–32)
CREAT SERPL-MCNC: 0.75 MG/DL (ref 0.6–1.29)
EGFRCR SERPLBLD CKD-EPI 2021: 113 ML/MIN/1.73M2
EST. AVERAGE GLUCOSE BLD GHB EST-MCNC: 114 MG/DL
EST. AVERAGE GLUCOSE BLD GHB EST-SCNC: 6.3 MMOL/L
GLUCOSE SERPL-MCNC: 88 MG/DL (ref 65–99)
HBA1C MFR BLD: 5.6 %
HDLC SERPL-MCNC: 45 MG/DL
LDLC SERPL CALC-MCNC: 164 MG/DL (CALC)
NONHDLC SERPL-MCNC: 202 MG/DL (CALC)
POTASSIUM SERPL-SCNC: 4 MMOL/L (ref 3.5–5.3)
PROT SERPL-MCNC: 6.7 G/DL (ref 6.1–8.1)
SODIUM SERPL-SCNC: 141 MMOL/L (ref 135–146)
TRIGL SERPL-MCNC: 212 MG/DL

## 2025-08-20 ENCOUNTER — RESULTS FOLLOW-UP (OUTPATIENT)
Age: 45
End: 2025-08-20
Payer: COMMERCIAL

## 2025-09-04 ENCOUNTER — APPOINTMENT (OUTPATIENT)
Dept: NUTRITION | Facility: CLINIC | Age: 45
End: 2025-09-04
Payer: COMMERCIAL

## 2026-02-11 ENCOUNTER — APPOINTMENT (OUTPATIENT)
Age: 46
End: 2026-02-11
Payer: COMMERCIAL